# Patient Record
Sex: FEMALE | Race: WHITE | NOT HISPANIC OR LATINO | Employment: OTHER | URBAN - METROPOLITAN AREA
[De-identification: names, ages, dates, MRNs, and addresses within clinical notes are randomized per-mention and may not be internally consistent; named-entity substitution may affect disease eponyms.]

---

## 2023-01-14 ENCOUNTER — PATIENT MESSAGE (OUTPATIENT)
Dept: PRIMARY CARE CLINIC | Facility: CLINIC | Age: 68
End: 2023-01-14
Payer: COMMERCIAL

## 2023-02-08 ENCOUNTER — TELEPHONE (OUTPATIENT)
Dept: PRIMARY CARE CLINIC | Facility: CLINIC | Age: 68
End: 2023-02-08
Payer: COMMERCIAL

## 2023-02-08 DIAGNOSIS — E78.5 HYPERLIPIDEMIA, UNSPECIFIED HYPERLIPIDEMIA TYPE: ICD-10-CM

## 2023-02-08 DIAGNOSIS — E28.0 HYPERESTROGENISM: ICD-10-CM

## 2023-02-08 DIAGNOSIS — E03.9 HYPOTHYROIDISM, UNSPECIFIED TYPE: ICD-10-CM

## 2023-02-08 DIAGNOSIS — R73.03 PRE-DIABETES: ICD-10-CM

## 2023-02-08 DIAGNOSIS — E05.00 GRAVES DISEASE: Primary | ICD-10-CM

## 2023-02-08 NOTE — TELEPHONE ENCOUNTER
----- Message from Marielena Trinh sent at 2/7/2023  1:19 PM CST -----  Contact: vinicius  Patient is calling to speak with the nurse regarding appointment. Reports needing to know if she still has her appointment on 0313 at 1 pm from the other location. Please give the patient a call back at .[phone]  Thanks trae

## 2023-04-26 ENCOUNTER — TELEPHONE (OUTPATIENT)
Dept: PRIMARY CARE CLINIC | Facility: CLINIC | Age: 68
End: 2023-04-26
Payer: COMMERCIAL

## 2023-04-26 DIAGNOSIS — R73.03 PRE-DIABETES: ICD-10-CM

## 2023-04-26 DIAGNOSIS — E05.00 GRAVES DISEASE: ICD-10-CM

## 2023-04-26 DIAGNOSIS — E28.0 HYPERESTROGENISM: ICD-10-CM

## 2023-04-26 DIAGNOSIS — E55.9 VITAMIN D DEFICIENCY: ICD-10-CM

## 2023-04-26 DIAGNOSIS — E78.5 HYPERLIPIDEMIA, UNSPECIFIED HYPERLIPIDEMIA TYPE: Primary | ICD-10-CM

## 2023-04-27 ENCOUNTER — PATIENT MESSAGE (OUTPATIENT)
Dept: PRIMARY CARE CLINIC | Facility: CLINIC | Age: 68
End: 2023-04-27
Payer: COMMERCIAL

## 2023-05-01 RX ORDER — PHENTERMINE HYDROCHLORIDE 37.5 MG/1
TABLET ORAL
Qty: 30 TABLET | Refills: 0 | Status: SHIPPED | OUTPATIENT
Start: 2023-05-01 | End: 2023-05-18 | Stop reason: SDUPTHER

## 2023-05-01 NOTE — TELEPHONE ENCOUNTER
----- Message from Olya Harvey sent at 5/1/2023  2:12 PM CDT -----  Type:  RX Refill Request    Who Called: pt  Refill or New Rx:refill  RX Name and Strength:phentermine  How is the patient currently taking it? (ex. 1XDay):1XDay  Is this a 30 day or 90 day Rx: 30  Preferred Pharmacy with phone number:.  [facility] [place] - [place] - [address] [facility] [address] [address] [address]  [place] [address]  Phone: [phone] Fax: [phone]  Local or Mail Order:local  Ordering Provider:Dr Garcia  Would the patient rather a call back or a response via [facility]? call  Best Call Back Number:[phone]   Additional Information: She would like to get her pharmacy changed to [facility].    Thank you

## 2023-05-09 ENCOUNTER — TELEPHONE (OUTPATIENT)
Dept: PRIMARY CARE CLINIC | Facility: CLINIC | Age: 68
End: 2023-05-09
Payer: COMMERCIAL

## 2023-05-09 DIAGNOSIS — Z01.818 PRE-OP EXAM: Primary | ICD-10-CM

## 2023-05-09 NOTE — TELEPHONE ENCOUNTER
----- Message from Deana Ovalle sent at 5/8/2023  2:10 PM CDT -----  Contact: Amy  Patient is calling regarding an appt. Reports wanting to do an EKG today if possible . Please give patient a call back at.[phone]

## 2023-05-09 NOTE — TELEPHONE ENCOUNTER
----- Message from Marielena Trinh sent at 5/9/2023 11:12 AM CDT -----  Contact: vinicius  .Type:  Patient Returning Call    Who Called:vinicius   Who Left Message for Patient:nurse  Does the patient know what this is regarding?:unknown  Would the patient rather a call back or a response via [facility]? Call back  Best Call Back Number:.[phone]  Additional Information: patient returning call

## 2023-05-10 ENCOUNTER — CLINICAL SUPPORT (OUTPATIENT)
Dept: PRIMARY CARE CLINIC | Facility: CLINIC | Age: 68
End: 2023-05-10
Payer: COMMERCIAL

## 2023-05-10 DIAGNOSIS — Z01.818 PRE-OP EXAM: Primary | ICD-10-CM

## 2023-05-10 PROCEDURE — 93010 ELECTROCARDIOGRAM REPORT: CPT | Mod: S$GLB,,, | Performed by: INTERNAL MEDICINE

## 2023-05-10 PROCEDURE — 93005 ELECTROCARDIOGRAM TRACING: CPT | Mod: PBBFAC,PN | Performed by: INTERNAL MEDICINE

## 2023-05-10 PROCEDURE — 93005 ELECTROCARDIOGRAM TRACING: CPT | Mod: S$GLB,,, | Performed by: FAMILY MEDICINE

## 2023-05-10 PROCEDURE — 93005 EKG 12-LEAD: ICD-10-PCS | Mod: S$GLB,,, | Performed by: FAMILY MEDICINE

## 2023-05-10 PROCEDURE — 93010 EKG 12-LEAD: ICD-10-PCS | Mod: S$GLB,,, | Performed by: INTERNAL MEDICINE

## 2023-05-15 LAB
25(OH)D3+25(OH)D2 SERPL-MCNC: 57.9 NG/ML (ref 30–100)
ALBUMIN SERPL-MCNC: 4.4 G/DL (ref 3.8–4.8)
ALBUMIN/GLOB SERPL: 1.8 {RATIO} (ref 1.2–2.2)
ALP SERPL-CCNC: 86 IU/L (ref 44–121)
ALT SERPL-CCNC: 17 IU/L (ref 0–32)
APO B SERPL-MCNC: 115 MG/DL
AST SERPL-CCNC: 20 IU/L (ref 0–40)
BILIRUB SERPL-MCNC: <0.2 MG/DL (ref 0–1.2)
BUN SERPL-MCNC: 28 MG/DL (ref 8–27)
BUN/CREAT SERPL: 20 (ref 12–28)
C PEPTIDE SERPL-MCNC: 3.9 NG/ML (ref 1.1–4.4)
CALCIUM SERPL-MCNC: 9.6 MG/DL (ref 8.7–10.3)
CHLORIDE SERPL-SCNC: 99 MMOL/L (ref 96–106)
CO2 SERPL-SCNC: 20 MMOL/L (ref 20–29)
CREAT SERPL-MCNC: 1.37 MG/DL (ref 0.57–1)
ERYTHROCYTE [DISTWIDTH] IN BLOOD BY AUTOMATED COUNT: 13 % (ref 11.7–15.4)
EST. GFR  (NO RACE VARIABLE): 43 ML/MIN/1.73
GLOBULIN SER CALC-MCNC: 2.5 G/DL (ref 1.5–4.5)
GLUCOSE SERPL-MCNC: 88 MG/DL (ref 70–99)
HBA1C MFR BLD: 5.6 % (ref 4.8–5.6)
HCT VFR BLD AUTO: 39.7 % (ref 34–46.6)
HGB BLD-MCNC: 13.2 G/DL (ref 11.1–15.9)
INSULIN SERPL-ACNC: 8.8 UIU/ML (ref 2.6–24.9)
MCH RBC QN AUTO: 29.4 PG (ref 26.6–33)
MCHC RBC AUTO-ENTMCNC: 33.2 G/DL (ref 31.5–35.7)
MCV RBC AUTO: 88 FL (ref 79–97)
PLATELET # BLD AUTO: 359 X10E3/UL (ref 150–450)
POTASSIUM SERPL-SCNC: 4.5 MMOL/L (ref 3.5–5.2)
PROT SERPL-MCNC: 6.9 G/DL (ref 6–8.5)
RBC # BLD AUTO: 4.49 X10E6/UL (ref 3.77–5.28)
SODIUM SERPL-SCNC: 142 MMOL/L (ref 134–144)
T3 SERPL-MCNC: 158 NG/DL (ref 71–180)
T4 FREE SERPL-MCNC: 0.73 NG/DL (ref 0.82–1.77)
TESTOST FREE SERPL-MCNC: 0.3 PG/ML (ref 0–4.2)
TESTOST SERPL-MCNC: 40.9 NG/DL (ref 7–40)
THYROGLOB AB SERPL-ACNC: 2.1 IU/ML (ref 0–0.9)
THYROPEROXIDASE AB SERPL-ACNC: 35 IU/ML (ref 0–34)
TSH SERPL DL<=0.005 MIU/L-ACNC: 2.1 UIU/ML (ref 0.45–4.5)
WBC # BLD AUTO: 8.3 X10E3/UL (ref 3.4–10.8)

## 2023-05-18 ENCOUNTER — OFFICE VISIT (OUTPATIENT)
Dept: PRIMARY CARE CLINIC | Facility: CLINIC | Age: 68
End: 2023-05-18
Payer: MEDICARE

## 2023-05-18 DIAGNOSIS — E55.9 VITAMIN D DEFICIENCY: ICD-10-CM

## 2023-05-18 DIAGNOSIS — Z86.39 HISTORY OF GRAVES' DISEASE: ICD-10-CM

## 2023-05-18 DIAGNOSIS — R53.83 FATIGUE, UNSPECIFIED TYPE: ICD-10-CM

## 2023-05-18 DIAGNOSIS — R60.0 PERIPHERAL EDEMA: ICD-10-CM

## 2023-05-18 DIAGNOSIS — F17.200 SMOKER: ICD-10-CM

## 2023-05-18 DIAGNOSIS — R73.03 PREDIABETES: ICD-10-CM

## 2023-05-18 DIAGNOSIS — E78.5 HYPERLIPIDEMIA, UNSPECIFIED HYPERLIPIDEMIA TYPE: ICD-10-CM

## 2023-05-18 DIAGNOSIS — N18.2 STAGE 2 CHRONIC KIDNEY DISEASE: ICD-10-CM

## 2023-05-18 DIAGNOSIS — E89.0 POSTABLATIVE HYPOTHYROIDISM: Primary | ICD-10-CM

## 2023-05-18 DIAGNOSIS — E27.40 HYPOADRENALISM: ICD-10-CM

## 2023-05-18 DIAGNOSIS — E28.0 HYPERESTROGENISM: ICD-10-CM

## 2023-05-18 DIAGNOSIS — E06.3 HASHIMOTO'S DISEASE: ICD-10-CM

## 2023-05-18 PROBLEM — F41.9 ANXIETY: Status: ACTIVE | Noted: 2023-05-18

## 2023-05-18 PROCEDURE — 99215 OFFICE O/P EST HI 40 MIN: CPT | Mod: 95,,, | Performed by: FAMILY MEDICINE

## 2023-05-18 PROCEDURE — 99215 PR OFFICE/OUTPT VISIT, EST, LEVL V, 40-54 MIN: ICD-10-PCS | Mod: 95,,, | Performed by: FAMILY MEDICINE

## 2023-05-18 RX ORDER — METFORMIN HYDROCHLORIDE 500 MG/1
1000 TABLET ORAL 2 TIMES DAILY
Qty: 360 TABLET | Refills: 1 | Status: SHIPPED | OUTPATIENT
Start: 2023-05-18 | End: 2024-01-09

## 2023-05-18 RX ORDER — ALPRAZOLAM 0.5 MG/1
0.5 TABLET, EXTENDED RELEASE ORAL 2 TIMES DAILY PRN
Qty: 45 TABLET | Refills: 2 | Status: SHIPPED | OUTPATIENT
Start: 2023-05-18 | End: 2023-09-15

## 2023-05-18 RX ORDER — TRIAMTERENE/HYDROCHLOROTHIAZID 37.5-25 MG
1 TABLET ORAL DAILY PRN
Qty: 90 TABLET | Refills: 1 | Status: SHIPPED | OUTPATIENT
Start: 2023-05-18 | End: 2023-09-18

## 2023-05-18 RX ORDER — MELOXICAM 15 MG/1
15 TABLET ORAL
COMMUNITY
Start: 2023-04-29 | End: 2023-09-18 | Stop reason: SDUPTHER

## 2023-05-18 RX ORDER — CYANOCOBALAMIN 1000 UG/ML
1000 INJECTION, SOLUTION INTRAMUSCULAR; SUBCUTANEOUS
Qty: 1 ML | Refills: 5 | Status: SHIPPED | OUTPATIENT
Start: 2023-05-18 | End: 2023-05-31 | Stop reason: SDUPTHER

## 2023-05-18 RX ORDER — HYDROCODONE BITARTRATE AND ACETAMINOPHEN 10; 325 MG/1; MG/1
.5-1 TABLET ORAL 3 TIMES DAILY
COMMUNITY
Start: 2023-04-11

## 2023-05-18 RX ORDER — PHENTERMINE HYDROCHLORIDE 37.5 MG/1
37.5 TABLET ORAL EVERY MORNING
Qty: 30 TABLET | Refills: 2 | Status: SHIPPED | OUTPATIENT
Start: 2023-05-18 | End: 2023-09-07

## 2023-05-18 NOTE — PROGRESS NOTES
"    [facility]       [address] [place] [address]      [phone]     Mary Garcia MD         .      Office Visit  05/18/2023  [id number]      SUBJECTIVE     HPI:  Amy Charles is a 67 y.o. female presents today in clinic for "No chief complaint on file.  ."   Patient is here from [facility]- has long hx of graves postablative hypothyroid- now on replacment therapy. Also has hx of anxiety and chronic pain- she is long time smoker and doesn't want to quit. She has battled with obesity and is getting it better controlled with phentermine  she also has hx of adhd and used to take adderall but patient likes taking adipex as it helps both with adhd and weight.  Denies any side effects.        ROS   Review of symptoms are negative except as noted.     PAST MEDICAL HISTORY     Past Medical History:   Diagnosis Date    Anxiety     BRCA gene mutation negative     Chronic bronchitis     DDD (degenerative disc disease)     Fatigue     Fibromyalgia     Graves' disease with exophthalmos     Hashimoto's disease     Hepatitis B carrier     Hyperthyroidism     Prediabetes 5/18/2023    Scotoma involving central area     Tinnitus of both ears        Past Surgical History:   Procedure Laterality Date    AUGMENTATION OF BREAST Bilateral 1979    Replaced 1998- Removed 2014    CATARACT EXTRACTION Bilateral 03/2015    ENDOMETRIAL ABLATION      HYSTEROSCOPY      LOOP ELECTROSURGICAL EXCISION PROCEDURE (LEEP)      THYROIDECTOMY      TONSILLECTOMY Bilateral 1965    TUBAL LIGATION Bilateral 1990       Social History     Socioeconomic History    Marital status:    Tobacco Use    Smoking status: Every Day     Packs/day: 1.00     Years: 20.00     Pack years: 20.00     Types: Cigarettes     Start date: 1/1/1992    Smokeless tobacco: Never   Substance and Sexual Activity    Alcohol use: Yes     Comment: Drinks on special occasions, approximately 4-5 times a year.    Drug " use: No    Sexual activity: Not Currently     Partners: Male     Birth control/protection: Post-menopausal     Comment: TL       Allergies as of 05/18/2023 - Reviewed 05/18/2023   Allergen Reaction Noted    Cefuroxime axetil  08/28/2014    Codeine  08/28/2014    Penicillins  08/28/2014    Sulfa (sulfonamide antibiotics)  08/28/2014    Tuna oil  11/14/2014    Wasp sting [allergen ext-venom-honey bee]  11/14/2014    Wasp venom Other (See Comments) 11/07/2021       HOME MEDS     Current Outpatient Medications   Medication Instructions    ALPRAZolam (XANAX XR) 0.5 mg, Oral, 2 times daily PRN    cyanocobalamin 1,000 mcg, Intramuscular, Every 30 days    HYDROcodone-acetaminophen (NORCO)  mg per tablet 0.5-1 tablets, Oral, 3 times daily    HYDROcodone-acetaminophen (NORCO) 5-325 mg per tablet TAKE 1 TABLET BY MOUTH EVERY 6 HOURS MAY CAUSE DROWSINESS    lansoprazole (PREVACID) 30 MG capsule Oral, Daily PRN    liothyronine (CYTOMEL) 25 MCG Tab 0.5 tablets, Oral, 2 times daily    meloxicam (MOBIC ORAL) No dose, route, or frequency recorded.    meloxicam (MOBIC) 15 mg, Oral    metFORMIN (GLUCOPHAGE) 1,000 mg, Oral, 2 times daily    mupirocin (BACTROBAN) 2 % ointment APPLY TOPICALLY 3 TIMES A DAY    phentermine (ADIPEX-P) 37.5 mg, Oral, Every morning    RESTASIS 0.05 % ophthalmic emulsion PLACE 1 drop IN EACH EYE TWICE DAILY    SYNTHROID 50 mcg, Oral, Every morning    triamterene-hydrochlorothiazide 37.5-25 mg (MAXZIDE-25) 37.5-25 mg per tablet 1 tablet, Oral, Daily PRN       The following were updated and reviewed by myself in the chart: medications, past medical history, past surgical history, family history, social history, and allergies.        Objective    OBJECTIVE   There were no vitals taken for this visit.    Wt Readings from Last 2 Encounters:   11/17/22 58.1 kg (128 lb)   11/09/21 66.7 kg (147 lb)       BP Readings from Last 2 Encounters:   11/17/22 123/79   11/09/21 124/67          Physical  Exam  Constitutional:       Appearance: Normal appearance. She is overweight.   HENT:      Head: Normocephalic and atraumatic.      Nose: Nose normal.   Eyes:      General: Allergic shiner present.      Comments: Exophtalmos bilateral   Pulmonary:      Effort: Pulmonary effort is normal.   Abdominal:      Comments: Truncal obesity   Skin:     Comments: pallor   Neurological:      General: No focal deficit present.      Mental Status: She is alert. Mental status is at baseline.      Cranial Nerves: Cranial nerves 2-12 are intact.      Sensory: Sensation is intact.      Motor: Motor function is intact.   Psychiatric:         Mood and Affect: Mood is anxious.         Behavior: Behavior normal.             LABS      LAB RESULTS, IF AVAILABLE, ARE DISCUSSED WITH PATIENT AND POSTED TO PATIENT PORTAL     ASSESSMENT & PLAN     1. Postablative hypothyroidism  *Alternate remedies that can help with inflammation associated with Hashimoto's include such things such as Selenium 200mg supplementation, adhering to a gluten free diet as well as adding LDN as a non - FDA approved option for therapy. Patient should comply with taking medications as directed. Patient should take thyroid meds on empty stomach and avoid taking with iron, calcium, zinc and fiber.  Potential risks are associated with suppressing TSH (increased arrhythmia and bone loss) as emphasis is on improving patient symptoms. Patient should notify us if any symptoms occur suggesting  overactive thyroid (fast heart rate, anxiety, sleeplessness, and tremors).     2. Hyperlipidemia, unspecified hyperlipidemia type  Reviewed importance of advanced lipid profiles. Advise daily exercise. Diet is recommended. Patients are encouraged to obtain healthy BMI weight. Risks associated with high cholesterol are well established and include but are not limited to heart disease, stroke and peripheral vascular disease. Patient should not smoke. Goal LDL particle number is <1000 and  ApoB <70. Basic LDL is below 100 or below 70 if diabetic. Some non-FDA approved dietary supplements that may be beneficial to patient include but is not limited to high fiber diet at least 30g daily; niacin ER non flush free variety 500mg-1,000mg; Omega-3 fish oil DHA+EPA = 1,000mg twice daily; baby dose aspirin 81mg; co-enzyme q10 500mg to help reduce statin-induced myalgia; red rice yeast 1200mg twice daily; berberine 1000mg-2000mg daily. Patient is encouraged to exercise routinely and adhere to heart healthy diet with Mediterranean diet showing the most consistent data to help with lipid management.      3. History of Graves' disease      4. Prediabetes  Pathophysiology of insulin resistance discussed with patient and therapeutic options were explained.  Dietary and lifestyle changes are recommended for the treatment of insulin resistance.   Low carbohydrate diet and/or possible intermittent fasting is recommended for insulin resistance and/or prediabetes.  Non FDA approved treatment options include but is not limited to GLP's, SGLT-2's, biguanides, and other glucose support supplements. Patient will notify us with any concerns or complaints regarding treatment plan.Weight loss is strongly encouraged and is emphasized to help reduce risk of diabetes.  On metformin- but will reduce to one half and see if this helps with renal function    5. Hyperestrogenism      6. Smoker  Doesn't want to stop smoking at this time    7. Vitamin D deficiency  On replacement therapy    8. Hypoadrenalism  Restarted dhea and testosteorne levels look ok    9. Stage 2 chronic kidney disease  Dehydration vs med effect- advisd patient to take diuretic as needed, reduce metformin and try stopping mobic    10. Peripheral edema    11. Hashimoto's disease  Alternate remedies that can help with inflammation associated with Hashimoto's include such things such as Selenium 200mg supplementation, adhering to a gluten free diet as well as adding  "LDN as a non - FDA approved option for therapy. Patient should comply with taking medications as directed. Patient should take thyroid meds on empty stomach and avoid taking with iron, calcium, zinc and fiber.  Potential risks are associated with suppressing TSH (increased arrhythmia and bone loss) as emphasis is on improving patient symptoms. Patient should notify us if any symptoms occur suggesting  overactive thyroid (fast heart rate, anxiety, sleeplessness, and tremors). Continue- tsh is ok    12. Fatigue, unspecified type  Better with b12 shots  -     cyanocobalamin 1,000 mcg/mL injection; Inject 1 mL (1,000 mcg total) into the muscle every 30 days.  Dispense: 1 mL; Refill: 5    Other orders  -     triamterene-hydrochlorothiazide 37.5-25 mg (MAXZIDE-25) 37.5-25 mg per tablet; Take 1 tablet by mouth daily as needed (edema).  Dispense: 90 tablet; Refill: 1  -     phentermine (ADIPEX-P) 37.5 mg tablet; Take 1 tablet (37.5 mg total) by mouth every morning.  Dispense: 30 tablet; Refill: 2  -     metFORMIN (GLUCOPHAGE) 500 MG tablet; Take 2 tablets (1,000 mg total) by mouth 2 (two) times daily.  Dispense: 360 tablet; Refill: 1  -     ALPRAZolam (XANAX XR) 0.5 MG Tb24; Take 1 tablet (0.5 mg total) by mouth 2 (two) times daily as needed (anxiety).  Dispense: 45 tablet; Refill: 2           I spent a total of 45 minutes face to face and non-face to face on the date of this visit.This includes time preparing to see the patient (eg, review of tests, notes), obtaining and/or reviewing additional history from an independent historian and/or outside medical records, documenting clinical information in the electronic health record, independently interpreting results and/or communicating results to the patient/family/caregiver, or care coordinator.      Disclaimer: Portions of this record may have been created with voice recognition software. Occasional wrong-word or "sound-a-like" substitutions may have occurred due to " "inherent limitations of voice recognition software. Read the chart carefully and recognize, using context, where substitutions have occurred."    Signed by:  Mary Garcia MD    @[email or web address]@       "

## 2023-05-19 ENCOUNTER — PATIENT MESSAGE (OUTPATIENT)
Dept: ADMINISTRATIVE | Facility: HOSPITAL | Age: 68
End: 2023-05-19
Payer: COMMERCIAL

## 2023-05-30 ENCOUNTER — PATIENT MESSAGE (OUTPATIENT)
Dept: PRIMARY CARE CLINIC | Facility: CLINIC | Age: 68
End: 2023-05-30
Payer: COMMERCIAL

## 2023-05-30 DIAGNOSIS — Z86.39 HISTORY OF GRAVES' DISEASE: ICD-10-CM

## 2023-05-30 DIAGNOSIS — R53.83 FATIGUE, UNSPECIFIED TYPE: ICD-10-CM

## 2023-05-30 DIAGNOSIS — R73.03 PREDIABETES: ICD-10-CM

## 2023-05-30 DIAGNOSIS — E89.0 POSTABLATIVE HYPOTHYROIDISM: Primary | ICD-10-CM

## 2023-06-01 RX ORDER — CYANOCOBALAMIN 1000 UG/ML
1000 INJECTION, SOLUTION INTRAMUSCULAR; SUBCUTANEOUS WEEKLY
Qty: 4 ML | Refills: 5 | Status: SHIPPED | OUTPATIENT
Start: 2023-06-01 | End: 2023-12-07

## 2023-08-03 ENCOUNTER — PATIENT MESSAGE (OUTPATIENT)
Dept: PRIMARY CARE CLINIC | Facility: CLINIC | Age: 68
End: 2023-08-03
Payer: COMMERCIAL

## 2023-08-03 RX ORDER — KETOCONAZOLE 20 MG/G
CREAM TOPICAL
COMMUNITY
End: 2024-01-18

## 2023-08-03 RX ORDER — KETOCONAZOLE 20 MG/G
CREAM TOPICAL DAILY
Qty: 30 G | Refills: 0 | Status: SHIPPED | OUTPATIENT
Start: 2023-08-03 | End: 2023-10-06

## 2023-08-03 RX ORDER — TRIAMCINOLONE ACETONIDE 1 MG/G
CREAM TOPICAL 2 TIMES DAILY
Qty: 80 G | Refills: 0 | Status: SHIPPED | OUTPATIENT
Start: 2023-08-03

## 2023-08-16 ENCOUNTER — PATIENT MESSAGE (OUTPATIENT)
Dept: PRIMARY CARE CLINIC | Facility: CLINIC | Age: 68
End: 2023-08-16
Payer: COMMERCIAL

## 2023-09-02 LAB
ALBUMIN SERPL-MCNC: 3.7 G/DL (ref 3.9–4.9)
ALBUMIN/GLOB SERPL: 1.6 {RATIO} (ref 1.2–2.2)
ALP SERPL-CCNC: 79 IU/L (ref 44–121)
ALT SERPL-CCNC: 13 IU/L (ref 0–32)
AST SERPL-CCNC: 14 IU/L (ref 0–40)
BILIRUB SERPL-MCNC: <0.2 MG/DL (ref 0–1.2)
BUN SERPL-MCNC: 24 MG/DL (ref 8–27)
BUN/CREAT SERPL: 21 (ref 12–28)
CALCIUM SERPL-MCNC: 9.2 MG/DL (ref 8.7–10.3)
CHLORIDE SERPL-SCNC: 103 MMOL/L (ref 96–106)
CHOLEST SERPL-MCNC: 216 MG/DL (ref 100–199)
CO2 SERPL-SCNC: 25 MMOL/L (ref 20–29)
CREAT SERPL-MCNC: 1.13 MG/DL (ref 0.57–1)
ERYTHROCYTE [DISTWIDTH] IN BLOOD BY AUTOMATED COUNT: 12.8 % (ref 11.7–15.4)
EST. GFR  (NO RACE VARIABLE): 54 ML/MIN/1.73
GLOBULIN SER CALC-MCNC: 2.3 G/DL (ref 1.5–4.5)
GLUCOSE SERPL-MCNC: 83 MG/DL (ref 70–99)
HCT VFR BLD AUTO: 38.9 % (ref 34–46.6)
HDLC SERPL-MCNC: 72 MG/DL
HGB BLD-MCNC: 12.5 G/DL (ref 11.1–15.9)
LDLC SERPL CALC-MCNC: 117 MG/DL (ref 0–99)
MCH RBC QN AUTO: 28.5 PG (ref 26.6–33)
MCHC RBC AUTO-ENTMCNC: 32.1 G/DL (ref 31.5–35.7)
MCV RBC AUTO: 89 FL (ref 79–97)
PLATELET # BLD AUTO: 343 X10E3/UL (ref 150–450)
POTASSIUM SERPL-SCNC: 4.5 MMOL/L (ref 3.5–5.2)
PROT SERPL-MCNC: 6 G/DL (ref 6–8.5)
RBC # BLD AUTO: 4.39 X10E6/UL (ref 3.77–5.28)
SODIUM SERPL-SCNC: 142 MMOL/L (ref 134–144)
T3 SERPL-MCNC: 152 NG/DL (ref 71–180)
T3FREE SERPL-MCNC: 3.8 PG/ML (ref 2–4.4)
T4 FREE SERPL-MCNC: 0.69 NG/DL (ref 0.82–1.77)
THYROPEROXIDASE AB SERPL-ACNC: 40 IU/ML (ref 0–34)
TRIGL SERPL-MCNC: 157 MG/DL (ref 0–149)
TSH SERPL DL<=0.005 MIU/L-ACNC: 3.23 UIU/ML (ref 0.45–4.5)
VLDLC SERPL CALC-MCNC: 27 MG/DL (ref 5–40)
WBC # BLD AUTO: 9.4 X10E3/UL (ref 3.4–10.8)

## 2023-09-06 ENCOUNTER — PATIENT MESSAGE (OUTPATIENT)
Dept: PRIMARY CARE CLINIC | Facility: CLINIC | Age: 68
End: 2023-09-06
Payer: COMMERCIAL

## 2023-09-07 RX ORDER — PHENTERMINE HYDROCHLORIDE 37.5 MG/1
TABLET ORAL
Qty: 30 TABLET | Refills: 2 | Status: SHIPPED | OUTPATIENT
Start: 2023-09-07 | End: 2023-12-07

## 2023-09-15 RX ORDER — ALPRAZOLAM 0.5 MG/1
TABLET, EXTENDED RELEASE ORAL
Qty: 45 TABLET | Refills: 2 | Status: SHIPPED | OUTPATIENT
Start: 2023-09-15 | End: 2023-12-18 | Stop reason: SDUPTHER

## 2023-09-18 ENCOUNTER — OFFICE VISIT (OUTPATIENT)
Dept: PRIMARY CARE CLINIC | Facility: CLINIC | Age: 68
End: 2023-09-18
Payer: MEDICARE

## 2023-09-18 ENCOUNTER — PATIENT MESSAGE (OUTPATIENT)
Dept: PRIMARY CARE CLINIC | Facility: CLINIC | Age: 68
End: 2023-09-18

## 2023-09-18 VITALS — HEIGHT: 59 IN | WEIGHT: 115 LBS | BODY MASS INDEX: 23.18 KG/M2

## 2023-09-18 DIAGNOSIS — M19.90 OSTEOARTHRITIS, UNSPECIFIED OSTEOARTHRITIS TYPE, UNSPECIFIED SITE: ICD-10-CM

## 2023-09-18 DIAGNOSIS — F17.200 SMOKER: ICD-10-CM

## 2023-09-18 DIAGNOSIS — E05.00 GRAVES' ORBITOPATHY: ICD-10-CM

## 2023-09-18 DIAGNOSIS — E89.0 POSTABLATIVE HYPOTHYROIDISM: ICD-10-CM

## 2023-09-18 DIAGNOSIS — N18.31 CHRONIC KIDNEY DISEASE, STAGE 3A: Primary | ICD-10-CM

## 2023-09-18 DIAGNOSIS — E78.5 HYPERLIPIDEMIA, UNSPECIFIED HYPERLIPIDEMIA TYPE: ICD-10-CM

## 2023-09-18 DIAGNOSIS — F41.9 ANXIETY: ICD-10-CM

## 2023-09-18 DIAGNOSIS — E06.3 HASHIMOTO'S DISEASE: ICD-10-CM

## 2023-09-18 DIAGNOSIS — R53.83 FATIGUE, UNSPECIFIED TYPE: ICD-10-CM

## 2023-09-18 DIAGNOSIS — F17.200 CURRENT SMOKER: ICD-10-CM

## 2023-09-18 DIAGNOSIS — R73.03 PREDIABETES: ICD-10-CM

## 2023-09-18 PROCEDURE — 99215 OFFICE O/P EST HI 40 MIN: CPT | Mod: 95,,, | Performed by: FAMILY MEDICINE

## 2023-09-18 PROCEDURE — 99215 PR OFFICE/OUTPT VISIT, EST, LEVL V, 40-54 MIN: ICD-10-PCS | Mod: 95,,, | Performed by: FAMILY MEDICINE

## 2023-09-18 RX ORDER — MELOXICAM 15 MG/1
15 TABLET ORAL DAILY
Qty: 30 TABLET | Refills: 5 | Status: SHIPPED | OUTPATIENT
Start: 2023-09-18

## 2023-09-18 RX ORDER — LIOTHYRONINE SODIUM 25 UG/1
TABLET ORAL
Qty: 30 TABLET | Refills: 5 | Status: SHIPPED | OUTPATIENT
Start: 2023-09-18

## 2023-09-18 RX ORDER — LEVOTHYROXINE SODIUM 75 UG/1
75 TABLET ORAL
Qty: 30 TABLET | Refills: 5 | Status: SHIPPED | OUTPATIENT
Start: 2023-09-18

## 2023-09-18 NOTE — ASSESSMENT & PLAN NOTE
Age versus medication related.  Did improve after stopping daily diuretic and taking metformin at a lesser dose and Mobic as needed.  We will continue to monitor.

## 2023-09-18 NOTE — ASSESSMENT & PLAN NOTE
Elevated, but patient has tried statins before and did not tolerate.  She is smoker with some high risk factors.

## 2023-09-18 NOTE — ASSESSMENT & PLAN NOTE
Patient does feel better being on the higher dose of thyroid medication so given TSH and symptoms, we will increase the Synthroid

## 2023-09-18 NOTE — PROGRESS NOTES
[facility]       [address] [place] [address]      [phone]     Mary Garcia MD         .      TELEMEDICINE VISIT  09/18/2023    Amy Charles is a 67 y.o. female who presents for Telemed visit.   The patient location is: [place]    The chief complaint leading to consultation is: is doing well. Wants to discuss recent labs. She is more fatigued than usual. Patient is  still taking diet meds.  Weight is stable and she has been able to maintain weight loss.  Adipex helps with both energy ang add .       REVIEW OF SYMPTOMS  All negative except as stated above.      PHYSICAL EXAM:  Alert and awake, Normal appearance, Fatigued appearing, and overweight/obese  Normocephalic, Atraumatic , and Normal facies  EOMI intact and Clear conjunctivae    No cyanosis and No labored breathing  Normal Abdomen and No tenderness/guarding  Normal Musculoskeletal Exam and Normal Range of Motion    Normal mood, Normal affect, Normal behavior, Congruent sppech, Normal memory, Normal speech, and concerned      ASSESSMENT AND PLAN      1. Chronic kidney disease, stage 3a  Comments:  improved since stopping  Assessment & Plan:  Age versus medication related.  Did improve after stopping daily diuretic and taking metformin at a lesser dose and Mobic as needed.  We will continue to monitor.      2. Postablative hypothyroidism  -     SYNTHROID 75 mcg tablet; Take 1 tablet (75 mcg total) by mouth before breakfast.  Dispense: 30 tablet; Refill: 5  -     liothyronine (CYTOMEL) 25 MCG Tab; Take 1/2 tablet twice daily as directed  Dispense: 30 tablet; Refill: 5    3. Fatigue, unspecified type    4. Hyperlipidemia, unspecified hyperlipidemia type  Overview:  Reviewed importance of advanced lipid profiles. Advise daily exercise. Diet is recommended. Patients are encouraged to obtain healthy BMI weight. Risks associated with high cholesterol are well established and  include but are not limited to heart disease, stroke and peripheral vascular disease. Patient should not smoke. Goal LDL particle number is <1000 and ApoB <70. Basic LDL is below 100 or below 70 if diabetic. Some non-FDA approved dietary supplements that may be beneficial to patient include but is not limited to high fiber diet at least 30g daily; niacin ER non flush free variety 500mg-1,000mg; Omega-3 fish oil DHA+EPA = 1,000mg twice daily; baby dose aspirin 81mg; co-enzyme q10 500mg to help reduce statin-induced myalgia; red rice yeast 1200mg twice daily; berberine 1000mg-2000mg daily. Patient is encouraged to exercise routinely and adhere to heart healthy diet with Mediterranean diet showing the most consistent data to help with lipid management.     Assessment & Plan:  Elevated, but patient has tried statins before and did not tolerate.  She is smoker with some high risk factors.      5. Current smoker  Assessment & Plan:  Patient is not willing to stop smoking at this point.      6. Smoker    7. Osteoarthritis, unspecified osteoarthritis type, unspecified site  -     meloxicam (MOBIC) 15 MG tablet; Take 1 tablet (15 mg total) by mouth once daily.  Dispense: 30 tablet; Refill: 5    8. Anxiety  Overview:  It is advised to identify triggers for anxiety and consider the impact of anxious thinking on functioning. Discussed strategies to regulate symptoms and need for compliance with treatment.  Therapy or counseling may be necessary if current regimen is ineffective. Patient will report any worsening or change in anxiety symptoms if necessary.      Assessment & Plan:  Improve with the extended release Xanax.  We will continue.      9. Graves' orbitopathy  Assessment & Plan:  Stable and asymptomatic      10. Hashimoto's disease  Overview:  Alternate remedies that can help with inflammation associated with Hashimoto's include such things such as Selenium 200mg supplementation, adhering to a gluten free diet as well as  adding LDN as a non - FDA approved option for therapy. Patient should comply with taking medications as directed. Patient should take thyroid meds on empty stomach and avoid taking with iron, calcium, zinc and fiber.  Potential risks are associated with suppressing TSH (increased arrhythmia and bone loss) as emphasis is on improving patient symptoms. Patient should notify us if any symptoms occur suggesting  overactive thyroid (fast heart rate, anxiety, sleeplessness, and tremors).     Assessment & Plan:  Patient does feel better being on the higher dose of thyroid medication so given TSH and symptoms, we will increase the Synthroid      11. Prediabetes  Overview:  Pathophysiology of insulin resistance discussed with patient and therapeutic options were explained.  Dietary and lifestyle changes are recommended for the treatment of insulin resistance.   Low carbohydrate diet and/or possible intermittent fasting is recommended for insulin resistance and/or prediabetes.  Non FDA approved treatment options include but is not limited to GLP's, SGLT-2's, biguanides, and other glucose support supplements. Patient will notify us with any concerns or complaints regarding treatment plan.Weight loss is strongly encouraged and is emphasized to help reduce risk of diabetes.      Assessment & Plan:  On metformin, okay to take at a reduced dose.            I spent a total of 40 minutes face to face and non-face to face on the date of this visit.This includes time preparing to see the patient (eg, review of tests, notes), obtaining and/or reviewing additional history from an independent historian and/or outside medical records, documenting clinical information in the electronic health record, independently interpreting results and/or communicating results to the patient/family/caregiver, or care coordinator.    Disclaimer: Portions of this record may have been created with voice recognition software. Occasional wrong-word or  ""sound-a-like" substitutions may have occurred due to inherent limitations of voice recognition software. Read the chart carefully and recognize, using context, where substitutions have occurred."    Visit type: Telemedicine:audio and visual  Each patient to whom he or she provides medical services by telemedicine is:  (1) informed of the relationship between the physician and patient and the respective role of any other health care provider with respect to management of the patient; and (2) notified that he or she may decline to receive medical services by telemedicine and may withdraw from such care at any time.    Signed by:  Mary Garcia MD         "

## 2023-10-06 RX ORDER — KETOCONAZOLE 20 MG/G
CREAM TOPICAL DAILY
Qty: 30 G | Refills: 0 | Status: SHIPPED | OUTPATIENT
Start: 2023-10-06

## 2023-12-06 ENCOUNTER — PATIENT MESSAGE (OUTPATIENT)
Dept: PRIMARY CARE CLINIC | Facility: CLINIC | Age: 68
End: 2023-12-06
Payer: COMMERCIAL

## 2023-12-06 DIAGNOSIS — R53.83 FATIGUE, UNSPECIFIED TYPE: ICD-10-CM

## 2023-12-07 RX ORDER — PHENTERMINE HYDROCHLORIDE 37.5 MG/1
TABLET ORAL
Qty: 30 TABLET | Refills: 2 | Status: SHIPPED | OUTPATIENT
Start: 2023-12-07 | End: 2024-03-07

## 2023-12-07 RX ORDER — CYANOCOBALAMIN 1000 UG/ML
INJECTION, SOLUTION INTRAMUSCULAR; SUBCUTANEOUS
Qty: 4 ML | Refills: 5 | Status: SHIPPED | OUTPATIENT
Start: 2023-12-07

## 2023-12-19 RX ORDER — ALPRAZOLAM 0.5 MG/1
TABLET, EXTENDED RELEASE ORAL
Qty: 45 TABLET | Refills: 2 | Status: SHIPPED | OUTPATIENT
Start: 2023-12-19 | End: 2024-03-18

## 2024-01-09 RX ORDER — METFORMIN HYDROCHLORIDE 500 MG/1
1000 TABLET ORAL 2 TIMES DAILY
Qty: 360 TABLET | Refills: 1 | Status: SHIPPED | OUTPATIENT
Start: 2024-01-09

## 2024-01-11 LAB
ALBUMIN SERPL-MCNC: 4.3 G/DL (ref 3.9–4.9)
ALBUMIN/GLOB SERPL: 1.7 {RATIO} (ref 1.2–2.2)
ALP SERPL-CCNC: 83 IU/L (ref 44–121)
ALT SERPL-CCNC: 15 IU/L (ref 0–32)
AST SERPL-CCNC: 18 IU/L (ref 0–40)
BASOPHILS # BLD AUTO: 0.1 X10E3/UL (ref 0–0.2)
BASOPHILS NFR BLD AUTO: 1 %
BILIRUB SERPL-MCNC: <0.2 MG/DL (ref 0–1.2)
BUN SERPL-MCNC: 21 MG/DL (ref 8–27)
BUN/CREAT SERPL: 17 (ref 12–28)
CALCIUM SERPL-MCNC: 9.7 MG/DL (ref 8.7–10.3)
CHLORIDE SERPL-SCNC: 101 MMOL/L (ref 96–106)
CHOLEST SERPL-MCNC: 268 MG/DL (ref 100–199)
CO2 SERPL-SCNC: 20 MMOL/L (ref 20–29)
CREAT SERPL-MCNC: 1.24 MG/DL (ref 0.57–1)
EOSINOPHIL # BLD AUTO: 0.2 X10E3/UL (ref 0–0.4)
EOSINOPHIL NFR BLD AUTO: 2 %
ERYTHROCYTE [DISTWIDTH] IN BLOOD BY AUTOMATED COUNT: 12.4 % (ref 11.7–15.4)
EST. GFR  (NO RACE VARIABLE): 48 ML/MIN/1.73
GLOBULIN SER CALC-MCNC: 2.5 G/DL (ref 1.5–4.5)
GLUCOSE SERPL-MCNC: 155 MG/DL (ref 70–99)
HBA1C MFR BLD: 5.7 % (ref 4.8–5.6)
HCT VFR BLD AUTO: 40.3 % (ref 34–46.6)
HDLC SERPL-MCNC: 69 MG/DL
HGB BLD-MCNC: 13.4 G/DL (ref 11.1–15.9)
IMM GRANULOCYTES # BLD AUTO: 0 X10E3/UL (ref 0–0.1)
IMM GRANULOCYTES NFR BLD AUTO: 0 %
LDLC SERPL CALC-MCNC: 170 MG/DL (ref 0–99)
LYMPHOCYTES # BLD AUTO: 3.5 X10E3/UL (ref 0.7–3.1)
LYMPHOCYTES NFR BLD AUTO: 35 %
MCH RBC QN AUTO: 28.6 PG (ref 26.6–33)
MCHC RBC AUTO-ENTMCNC: 33.3 G/DL (ref 31.5–35.7)
MCV RBC AUTO: 86 FL (ref 79–97)
MONOCYTES # BLD AUTO: 0.7 X10E3/UL (ref 0.1–0.9)
MONOCYTES NFR BLD AUTO: 7 %
NEUTROPHILS # BLD AUTO: 5.6 X10E3/UL (ref 1.4–7)
NEUTROPHILS NFR BLD AUTO: 55 %
PLATELET # BLD AUTO: 401 X10E3/UL (ref 150–450)
POTASSIUM SERPL-SCNC: 5.5 MMOL/L (ref 3.5–5.2)
PROT SERPL-MCNC: 6.8 G/DL (ref 6–8.5)
RBC # BLD AUTO: 4.69 X10E6/UL (ref 3.77–5.28)
SODIUM SERPL-SCNC: 141 MMOL/L (ref 134–144)
T3FREE SERPL-MCNC: 5.8 PG/ML (ref 2–4.4)
T4 FREE SERPL-MCNC: 0.75 NG/DL (ref 0.82–1.77)
THYROPEROXIDASE AB SERPL-ACNC: 32 IU/ML (ref 0–34)
TRIGL SERPL-MCNC: 163 MG/DL (ref 0–149)
TSH SERPL DL<=0.005 MIU/L-ACNC: 0.84 UIU/ML (ref 0.45–4.5)
VLDLC SERPL CALC-MCNC: 29 MG/DL (ref 5–40)
WBC # BLD AUTO: 10.1 X10E3/UL (ref 3.4–10.8)

## 2024-01-18 ENCOUNTER — OFFICE VISIT (OUTPATIENT)
Dept: PRIMARY CARE CLINIC | Facility: CLINIC | Age: 69
End: 2024-01-18
Payer: MEDICARE

## 2024-01-18 ENCOUNTER — PATIENT MESSAGE (OUTPATIENT)
Dept: PRIMARY CARE CLINIC | Facility: CLINIC | Age: 69
End: 2024-01-18

## 2024-01-18 DIAGNOSIS — E78.2 MIXED HYPERLIPIDEMIA: ICD-10-CM

## 2024-01-18 DIAGNOSIS — N18.31 CHRONIC KIDNEY DISEASE, STAGE 3A: ICD-10-CM

## 2024-01-18 DIAGNOSIS — F17.200 CURRENT SMOKER: ICD-10-CM

## 2024-01-18 DIAGNOSIS — R73.03 PREDIABETES: ICD-10-CM

## 2024-01-18 DIAGNOSIS — J44.9 CHRONIC OBSTRUCTIVE PULMONARY DISEASE, UNSPECIFIED COPD TYPE: ICD-10-CM

## 2024-01-18 DIAGNOSIS — E89.0 POSTABLATIVE HYPOTHYROIDISM: Primary | ICD-10-CM

## 2024-01-18 PROBLEM — E11.9 TYPE 2 DIABETES MELLITUS WITHOUT COMPLICATION, WITHOUT LONG-TERM CURRENT USE OF INSULIN: Status: ACTIVE | Noted: 2024-01-18

## 2024-01-18 PROCEDURE — 99215 OFFICE O/P EST HI 40 MIN: CPT | Mod: 95,,, | Performed by: FAMILY MEDICINE

## 2024-01-18 NOTE — PROGRESS NOTES
TELEMEDICINE VISIT        [facility]       [address] [place] [address]      [phone]     Mary Garcia MD         .        Amy Charles is a 68 y.o. female who presents for Telemed visit.   The patient location is: [place]    The reason for telemedicine visit: patient is here for lab follow up- has not been doing well with diet and gets in rut during the winter holidays.  Has more depressive symptoms typical of the season.  She is also nto taking throid supplements liek she was, and she does feel the difference since not taking.  She still takes xanax for anxiety and thinks this does help. Pain for back has also started coming back and will likely need more pain meds- only takes xanax.  Patient is also needing referral to ENT for possible hearing aids. Patient is sensitive ot changes in thyroid and only feels better on slightly   Overactive thyroid.     REVIEW OF SYMPTOMS  All negative except as stated above.      PHYSICAL EXAM:  Alert and awake, Normal appearance, and Fatigued appearing  Normocephalic, Atraumatic , and Normal facies  EOMI intact and Clear conjunctivae exophthalmous    No cyanosis and No labored breathing  Normal Abdomen, No tenderness/guarding, and Increased Abdominal Girth  Limited Range of Motion  Grossly intact, No tremors observed, and No tics observed  Normal mood, Normal affect, Normal behavior, Congruent sppech, Normal memory, Normal speech, and concerned  Normal Skin and Normal Hair    ASSESSMENT AND PLAN      1. Postablative hypothyroidism  TSH is normal- did take t3 day of labs- ok to self adjust thryoid meds as patient is very sensitive to her needs.  Will monitor for now- patient will fu with eye doctor.   2. Chronic obstructive pulmonary disease, unspecified COPD type  Still smoking- doesn't take any meds- understands importance  3. Chronic kidney disease, stage 3a  Sligtht worse - admits to not drinking  "water. No longer taking diuretics  4. Current smoker  couneled  5. Prediabetes  Overview:  Pathophysiology of insulin resistance discussed with patient and therapeutic options were explained.  Dietary and lifestyle changes are recommended for the treatment of insulin resistance.   Low carbohydrate diet and/or possible intermittent fasting is recommended for insulin resistance and/or prediabetes.  Non FDA approved treatment options include but is not limited to GLP's, SGLT-2's, biguanides, and other glucose support supplements. Patient will notify us with any concerns or complaints regarding treatment plan.Weight loss is strongly encouraged and is emphasized to help reduce risk of diabetes.    On metformin- stable for now    6. Mixed hyperlipidemia  Elevated- patient reluctant to take meds- wants to work on diet for now and then reassess.  Understands risk associated with elevated lipids untreated          I spent a total of 45 minutes face to face and non-face to face on the date of this visit.This includes time preparing to see the patient (eg, review of tests, notes), obtaining and/or reviewing additional history from an independent historian and/or outside medical records, documenting clinical information in the electronic health record, independently interpreting results and/or communicating results to the patient/family/caregiver, or care coordinator.    Disclaimer: Portions of this record may have been created with voice recognition software. Occasional wrong-word or "sound-a-like" substitutions may have occurred due to inherent limitations of voice recognition software. Read the chart carefully and recognize, using context, where substitutions have occurred."    Visit type: Telemedicine:audio and visual  Each patient to whom he or she provides medical services by telemedicine is:  (1) informed of the relationship between the physician and patient and the respective role of any other health care provider with " respect to management of the patient; and (2) notified that he or she may decline to receive medical services by telemedicine and may withdraw from such care at any time.    Signed by:  Mary Garcia MD

## 2024-03-06 ENCOUNTER — PATIENT MESSAGE (OUTPATIENT)
Dept: PRIMARY CARE CLINIC | Facility: CLINIC | Age: 69
End: 2024-03-06
Payer: COMMERCIAL

## 2024-03-06 RX ORDER — PHENTERMINE HYDROCHLORIDE 37.5 MG/1
TABLET ORAL
Qty: 30 TABLET | Refills: 2 | Status: CANCELLED | OUTPATIENT
Start: 2024-03-06

## 2024-03-06 NOTE — TELEPHONE ENCOUNTER
----- Message from Debbie Nassar sent at 3/6/2024 12:19 PM CST -----  Patient would like to schedule her 4 month follow up appointment. Patient stated she have been trying since January to schedule an appointment and she is worried about her levels. Call back number is [phone]. Thx.EL

## 2024-03-06 NOTE — TELEPHONE ENCOUNTER
Spoke with pt and advised her that Dr. Garcia scheduled is not open at this time. Pt verbalized understanding

## 2024-03-07 RX ORDER — PHENTERMINE HYDROCHLORIDE 37.5 MG/1
TABLET ORAL
Qty: 30 TABLET | Refills: 2 | Status: SHIPPED | OUTPATIENT
Start: 2024-03-07 | End: 2024-06-10 | Stop reason: SDUPTHER

## 2024-03-18 RX ORDER — ALPRAZOLAM 0.5 MG/1
TABLET, EXTENDED RELEASE ORAL
Qty: 45 TABLET | Refills: 2 | Status: SHIPPED | OUTPATIENT
Start: 2024-03-18 | End: 2024-04-12 | Stop reason: SDUPTHER

## 2024-03-21 ENCOUNTER — TELEPHONE (OUTPATIENT)
Dept: PRIMARY CARE CLINIC | Facility: CLINIC | Age: 69
End: 2024-03-21
Payer: COMMERCIAL

## 2024-03-21 NOTE — TELEPHONE ENCOUNTER
Patient needs labs sent to [facility] for her 4 month follow-up with a virtual with Dr. Medina

## 2024-03-21 NOTE — TELEPHONE ENCOUNTER
----- Message from Norma Ramsey sent at 3/21/2024 12:06 PM CDT -----  Contact: p[t  Pt is calling in regard to her needing appt barrie dinh/dr chappell.  Please call her back at [phone]  thanks/mpd   (no solutions in book it)

## 2024-04-05 ENCOUNTER — TELEPHONE (OUTPATIENT)
Dept: PRIMARY CARE CLINIC | Facility: CLINIC | Age: 69
End: 2024-04-05
Payer: COMMERCIAL

## 2024-04-05 DIAGNOSIS — E89.0 POSTABLATIVE HYPOTHYROIDISM: ICD-10-CM

## 2024-04-05 RX ORDER — LIOTHYRONINE SODIUM 25 UG/1
TABLET ORAL
Qty: 30 TABLET | Refills: 5 | Status: SHIPPED | OUTPATIENT
Start: 2024-04-05

## 2024-04-05 NOTE — TELEPHONE ENCOUNTER
----- Message from Fátima Cook sent at 4/5/2024 11:39 AM CDT -----  Contact: self   Patient is calling to have orders placed for Lab work. She also needs these orders sent to [facility]. Please call to advise

## 2024-04-05 NOTE — TELEPHONE ENCOUNTER
Will this pt be going with you to your new specialty . If so this pt states she needs her labs ordered and sent to [facility]

## 2024-04-09 ENCOUNTER — PATIENT MESSAGE (OUTPATIENT)
Dept: PRIMARY CARE CLINIC | Facility: CLINIC | Age: 69
End: 2024-04-09
Payer: COMMERCIAL

## 2024-04-09 ENCOUNTER — TELEPHONE (OUTPATIENT)
Dept: PRIMARY CARE CLINIC | Facility: CLINIC | Age: 69
End: 2024-04-09
Payer: COMMERCIAL

## 2024-04-09 DIAGNOSIS — E03.9 HYPOTHYROIDISM, UNSPECIFIED TYPE: ICD-10-CM

## 2024-04-09 DIAGNOSIS — E28.0 HYPERESTROGENISM: ICD-10-CM

## 2024-04-09 DIAGNOSIS — Z86.39 HISTORY OF GRAVES' DISEASE: ICD-10-CM

## 2024-04-09 DIAGNOSIS — E05.00 GRAVES DISEASE: ICD-10-CM

## 2024-04-09 DIAGNOSIS — E78.5 HYPERLIPIDEMIA, UNSPECIFIED HYPERLIPIDEMIA TYPE: ICD-10-CM

## 2024-04-09 DIAGNOSIS — N18.2 STAGE 2 CHRONIC KIDNEY DISEASE: ICD-10-CM

## 2024-04-09 DIAGNOSIS — R73.03 PRE-DIABETES: Primary | ICD-10-CM

## 2024-04-09 DIAGNOSIS — E78.2 MIXED HYPERLIPIDEMIA: ICD-10-CM

## 2024-04-09 DIAGNOSIS — J44.9 CHRONIC OBSTRUCTIVE PULMONARY DISEASE, UNSPECIFIED COPD TYPE: ICD-10-CM

## 2024-04-09 DIAGNOSIS — E55.9 VITAMIN D DEFICIENCY: ICD-10-CM

## 2024-04-09 NOTE — TELEPHONE ENCOUNTER
----- Message from Shona Bonilla sent at 4/8/2024  3:48 PM CDT -----  Contact: Amy  Pt is calling in regards to wanting to know if she qualify to follow the doctor to her new specialty. Pt stated she really needs to see the doctor. Pt wants to know who will be putting in her lab orders. Pt have left a message previously but no call back and wants to speak with doctor only. Please call back at [phone]                            Thanks  KT

## 2024-04-10 ENCOUNTER — TELEPHONE (OUTPATIENT)
Dept: PRIMARY CARE CLINIC | Facility: CLINIC | Age: 69
End: 2024-04-10
Payer: COMMERCIAL

## 2024-04-10 DIAGNOSIS — E89.0 POSTABLATIVE HYPOTHYROIDISM: Primary | ICD-10-CM

## 2024-04-10 LAB
25(OH)D3+25(OH)D2 SERPL-MCNC: 69.3 NG/ML (ref 30–100)
ALBUMIN SERPL-MCNC: 4.1 G/DL (ref 3.9–4.9)
ALBUMIN/GLOB SERPL: 1.5 {RATIO} (ref 1.2–2.2)
ALP SERPL-CCNC: 81 IU/L (ref 44–121)
ALT SERPL-CCNC: 15 IU/L (ref 0–32)
AST SERPL-CCNC: 19 IU/L (ref 0–40)
BASOPHILS # BLD AUTO: 0.1 X10E3/UL (ref 0–0.2)
BASOPHILS NFR BLD AUTO: 1 %
BILIRUB SERPL-MCNC: <0.2 MG/DL (ref 0–1.2)
BUN SERPL-MCNC: 21 MG/DL (ref 8–27)
BUN/CREAT SERPL: 19 (ref 12–28)
CALCIUM SERPL-MCNC: 9.6 MG/DL (ref 8.7–10.3)
CHLORIDE SERPL-SCNC: 103 MMOL/L (ref 96–106)
CHOLEST SERPL-MCNC: 256 MG/DL (ref 100–199)
CO2 SERPL-SCNC: 21 MMOL/L (ref 20–29)
CREAT SERPL-MCNC: 1.08 MG/DL (ref 0.57–1)
EOSINOPHIL # BLD AUTO: 0.2 X10E3/UL (ref 0–0.4)
EOSINOPHIL NFR BLD AUTO: 2 %
ERYTHROCYTE [DISTWIDTH] IN BLOOD BY AUTOMATED COUNT: 12.6 % (ref 11.7–15.4)
EST. GFR  (NO RACE VARIABLE): 57 ML/MIN/1.73
GLOBULIN SER CALC-MCNC: 2.7 G/DL (ref 1.5–4.5)
GLUCOSE SERPL-MCNC: 86 MG/DL (ref 70–99)
HBA1C MFR BLD: 5.6 % (ref 4.8–5.6)
HCT VFR BLD AUTO: 40.1 % (ref 34–46.6)
HDLC SERPL-MCNC: 64 MG/DL
HGB BLD-MCNC: 12.6 G/DL (ref 11.1–15.9)
IMM GRANULOCYTES # BLD AUTO: 0 X10E3/UL (ref 0–0.1)
IMM GRANULOCYTES NFR BLD AUTO: 0 %
LDLC SERPL CALC-MCNC: 162 MG/DL (ref 0–99)
LYMPHOCYTES # BLD AUTO: 2.7 X10E3/UL (ref 0.7–3.1)
LYMPHOCYTES NFR BLD AUTO: 36 %
MCH RBC QN AUTO: 27.8 PG (ref 26.6–33)
MCHC RBC AUTO-ENTMCNC: 31.4 G/DL (ref 31.5–35.7)
MCV RBC AUTO: 88 FL (ref 79–97)
MONOCYTES # BLD AUTO: 0.6 X10E3/UL (ref 0.1–0.9)
MONOCYTES NFR BLD AUTO: 9 %
NEUTROPHILS # BLD AUTO: 3.9 X10E3/UL (ref 1.4–7)
NEUTROPHILS NFR BLD AUTO: 52 %
PLATELET # BLD AUTO: 342 X10E3/UL (ref 150–450)
POTASSIUM SERPL-SCNC: 4.9 MMOL/L (ref 3.5–5.2)
PROT SERPL-MCNC: 6.8 G/DL (ref 6–8.5)
RBC # BLD AUTO: 4.54 X10E6/UL (ref 3.77–5.28)
SODIUM SERPL-SCNC: 142 MMOL/L (ref 134–144)
T3FREE SERPL-MCNC: 2.1 PG/ML (ref 2–4.4)
T4 FREE SERPL-MCNC: 0.63 NG/DL (ref 0.82–1.77)
THYROPEROXIDASE AB SERPL-ACNC: 31 IU/ML (ref 0–34)
TRIGL SERPL-MCNC: 169 MG/DL (ref 0–149)
TSH SERPL DL<=0.005 MIU/L-ACNC: 0.6 UIU/ML (ref 0.45–4.5)
VLDLC SERPL CALC-MCNC: 30 MG/DL (ref 5–40)
WBC # BLD AUTO: 7.5 X10E3/UL (ref 3.4–10.8)

## 2024-04-12 ENCOUNTER — OFFICE VISIT (OUTPATIENT)
Dept: INTERNAL MEDICINE | Facility: CLINIC | Age: 69
End: 2024-04-12
Payer: COMMERCIAL

## 2024-04-12 VITALS
DIASTOLIC BLOOD PRESSURE: 66 MMHG | SYSTOLIC BLOOD PRESSURE: 128 MMHG | OXYGEN SATURATION: 98 % | RESPIRATION RATE: 18 BRPM | HEIGHT: 59 IN | HEART RATE: 96 BPM | BODY MASS INDEX: 23.69 KG/M2 | WEIGHT: 117.5 LBS

## 2024-04-12 DIAGNOSIS — E06.3 HASHIMOTO'S DISEASE: Primary | ICD-10-CM

## 2024-04-12 DIAGNOSIS — E89.0 POSTABLATIVE HYPOTHYROIDISM: ICD-10-CM

## 2024-04-12 DIAGNOSIS — F41.9 ANXIETY: ICD-10-CM

## 2024-04-12 DIAGNOSIS — M47.26 OSTEOARTHRITIS OF SPINE WITH RADICULOPATHY, LUMBAR REGION: ICD-10-CM

## 2024-04-12 DIAGNOSIS — E05.00 GRAVES' ORBITOPATHY: ICD-10-CM

## 2024-04-12 DIAGNOSIS — E55.9 VITAMIN D DEFICIENCY: ICD-10-CM

## 2024-04-12 DIAGNOSIS — J44.9 CHRONIC OBSTRUCTIVE PULMONARY DISEASE, UNSPECIFIED COPD TYPE: ICD-10-CM

## 2024-04-12 DIAGNOSIS — M85.80 OSTEOPENIA, UNSPECIFIED LOCATION: ICD-10-CM

## 2024-04-12 DIAGNOSIS — R73.03 PREDIABETES: ICD-10-CM

## 2024-04-12 DIAGNOSIS — E78.2 MIXED HYPERLIPIDEMIA: ICD-10-CM

## 2024-04-12 DIAGNOSIS — Z87.891 PERSONAL HISTORY OF NICOTINE DEPENDENCE: ICD-10-CM

## 2024-04-12 DIAGNOSIS — F17.200 CURRENT SMOKER: ICD-10-CM

## 2024-04-12 DIAGNOSIS — N18.31 CHRONIC KIDNEY DISEASE, STAGE 3A: ICD-10-CM

## 2024-04-12 PROBLEM — E11.9 TYPE 2 DIABETES MELLITUS WITHOUT COMPLICATION, WITHOUT LONG-TERM CURRENT USE OF INSULIN: Status: RESOLVED | Noted: 2024-01-18 | Resolved: 2024-04-12

## 2024-04-12 PROCEDURE — 99397 PER PM REEVAL EST PAT 65+ YR: CPT | Mod: S$PBB,GZ,, | Performed by: FAMILY MEDICINE

## 2024-04-12 PROCEDURE — 99999 PR PBB SHADOW E&M-EST. PATIENT-LVL V: CPT | Mod: PBBFAC,,, | Performed by: FAMILY MEDICINE

## 2024-04-12 PROCEDURE — 99215 OFFICE O/P EST HI 40 MIN: CPT | Mod: PBBFAC | Performed by: FAMILY MEDICINE

## 2024-04-12 RX ORDER — ALPRAZOLAM 0.5 MG/1
0.5 TABLET, EXTENDED RELEASE ORAL NIGHTLY
Qty: 45 TABLET | Refills: 2 | Status: SHIPPED | OUTPATIENT
Start: 2024-04-12 | End: 2024-06-18

## 2024-04-12 RX ORDER — LEVOTHYROXINE SODIUM 75 UG/1
TABLET ORAL
Qty: 90 TABLET | Refills: 0 | Status: SHIPPED | OUTPATIENT
Start: 2024-04-12 | End: 2024-04-12 | Stop reason: SDUPTHER

## 2024-04-12 RX ORDER — ROSUVASTATIN CALCIUM 10 MG/1
10 TABLET, COATED ORAL DAILY
Qty: 30 TABLET | Refills: 3 | Status: SHIPPED | OUTPATIENT
Start: 2024-04-12 | End: 2025-04-12

## 2024-04-12 RX ORDER — ALENDRONATE SODIUM 70 MG/1
70 TABLET ORAL
Qty: 4 TABLET | Refills: 11 | Status: SHIPPED | OUTPATIENT
Start: 2024-04-12 | End: 2025-04-12

## 2024-04-12 RX ORDER — LEVOTHYROXINE SODIUM 75 UG/1
75 TABLET ORAL
Qty: 90 TABLET | Refills: 0 | Status: SHIPPED | OUTPATIENT
Start: 2024-04-12

## 2024-04-12 NOTE — PROGRESS NOTES
"Subjective:       Patient ID: Amy Charles is a 68 y.o. female.    Chief Complaint: Establish Care    68-year-old female patient new to my clinic here to establish care previously seen by Dr. Garcia.   Patient with Patient Active Problem List:     Current smoker     Graves' orbitopathy     Anxiety     Hashimoto's disease     Vitamin D deficiency     Prediabetes     Mixed hyperlipidemia     Chronic kidney disease, stage 3a     Postablative hypothyroidism     Chronic obstructive pulmonary disease, unspecified COPD type     Osteoarthritis of spine with radiculopathy, lumbar region     Osteopenia     Personal history of nicotine dependence    Reports that she has been on current medication and would like to continue, as it has been extremely helpful with her symptoms as well as her labs.  Patient has been feeling better, and reports that secondary to ADD has been doing well on Adipex, to help her lose weight as well as with concentration as prior to that she was on Adderall.   Dr. Garcia has been prescribing Adipex to help her with attention concentration deficits as well  Denies any trouble with breathing  Has been smoking 1 pack of cigarettes daily for the past 30 years, denies of any chest tightness or shortness of breath or wheezing      Review of Systems   Constitutional:  Negative for fatigue.   Eyes:  Negative for visual disturbance.   Respiratory:  Negative for shortness of breath.    Cardiovascular:  Negative for chest pain and leg swelling.   Gastrointestinal:  Negative for abdominal pain, nausea and vomiting.   Musculoskeletal:  Negative for myalgias.   Skin:  Negative for rash.   Neurological:  Negative for light-headedness and headaches.   Psychiatric/Behavioral:  Positive for decreased concentration. Negative for sleep disturbance.          /66 (BP Location: Left arm, Patient Position: Sitting, BP Method: Medium (Manual))   Pulse 96   Resp 18   Ht 4' 11" (1.499 m)   Wt 53.3 kg (117 lb 8.1 oz)   " SpO2 98%   BMI 23.73 kg/m²   Objective:      Physical Exam  Constitutional:       Appearance: She is well-developed.   HENT:      Head: Normocephalic and atraumatic.   Eyes:      Comments: Positive for exophthalmos   Cardiovascular:      Rate and Rhythm: Normal rate and regular rhythm.      Heart sounds: Normal heart sounds. No murmur heard.  Pulmonary:      Effort: Pulmonary effort is normal.      Breath sounds: Normal breath sounds. No wheezing.   Abdominal:      General: Bowel sounds are normal.      Palpations: Abdomen is soft.      Tenderness: There is no abdominal tenderness.   Skin:     General: Skin is warm and dry.      Findings: No rash.   Neurological:      Mental Status: She is alert and oriented to person, place, and time.   Psychiatric:         Mood and Affect: Mood normal.         Telephone on 04/09/2024   Component Date Value Ref Range Status    Cholesterol 04/09/2024 256 (H)  100 - 199 mg/dL Final    Triglycerides 04/09/2024 169 (H)  0 - 149 mg/dL Final    HDL 04/09/2024 64  >39 mg/dL Final    VLDL Cholesterol Jewel 04/09/2024 30  5 - 40 mg/dL Final    LDL Calculated 04/09/2024 162 (H)  0 - 99 mg/dL Final    Glucose 04/09/2024 86  70 - 99 mg/dL Final    BUN 04/09/2024 21  8 - 27 mg/dL Final    Creatinine 04/09/2024 1.08 (H)  0.57 - 1.00 mg/dL Final    eGFR 04/09/2024 57 (L)  >59 mL/min/1.73 Final    BUN/Creatinine Ratio 04/09/2024 19  12 - 28 Final    Sodium 04/09/2024 142  134 - 144 mmol/L Final    Potassium 04/09/2024 4.9  3.5 - 5.2 mmol/L Final    Chloride 04/09/2024 103  96 - 106 mmol/L Final    CO2 04/09/2024 21  20 - 29 mmol/L Final    Calcium 04/09/2024 9.6  8.7 - 10.3 mg/dL Final    Protein, Total 04/09/2024 6.8  6.0 - 8.5 g/dL Final    Albumin 04/09/2024 4.1  3.9 - 4.9 g/dL Final    Globulin, Total 04/09/2024 2.7  1.5 - 4.5 g/dL Final    Albumin/Globulin Ratio 04/09/2024 1.5  1.2 - 2.2 Final    Total Bilirubin 04/09/2024 <0.2  0.0 - 1.2 mg/dL Final    Alkaline Phosphatase 04/09/2024 81  44 -  121 IU/L Final    AST 04/09/2024 19  0 - 40 IU/L Final    ALT 04/09/2024 15  0 - 32 IU/L Final    WBC 04/09/2024 7.5  3.4 - 10.8 x10E3/uL Final    RBC 04/09/2024 4.54  3.77 - 5.28 x10E6/uL Final    Hemoglobin 04/09/2024 12.6  11.1 - 15.9 g/dL Final    Hematocrit 04/09/2024 40.1  34.0 - 46.6 % Final    MCV 04/09/2024 88  79 - 97 fL Final    MCH 04/09/2024 27.8  26.6 - 33.0 pg Final    MCHC 04/09/2024 31.4 (L)  31.5 - 35.7 g/dL Final    RDW 04/09/2024 12.6  11.7 - 15.4 % Final    Platelets 04/09/2024 342  150 - 450 x10E3/uL Final    Neutrophils 04/09/2024 52  Not Estab. % Final    Lymphs 04/09/2024 36  Not Estab. % Final    Monocytes 04/09/2024 9  Not Estab. % Final    Eos 04/09/2024 2  Not Estab. % Final    Basos 04/09/2024 1  Not Estab. % Final    Neutrophils (Absolute) 04/09/2024 3.9  1.4 - 7.0 x10E3/uL Final    Lymphs (Absolute) 04/09/2024 2.7  0.7 - 3.1 x10E3/uL Final    Monocytes(Absolute) 04/09/2024 0.6  0.1 - 0.9 x10E3/uL Final    Eos (Absolute) 04/09/2024 0.2  0.0 - 0.4 x10E3/uL Final    Baso (Absolute) 04/09/2024 0.1  0.0 - 0.2 x10E3/uL Final    Immature Granulocytes 04/09/2024 0  Not Estab. % Final    Immature Grans (Abs) 04/09/2024 0.0  0.0 - 0.1 x10E3/uL Final    Hemoglobin A1c 04/09/2024 5.6  4.8 - 5.6 % Final    Comment:          Prediabetes: 5.7 - 6.4           Diabetes: >6.4           Glycemic control for adults with diabetes: <7.0      Thyroid Peroxidase Ab 04/09/2024 31  0 - 34 IU/mL Final    T3, Free 04/09/2024 2.1  2.0 - 4.4 pg/mL Final    T4, Free 04/09/2024 0.63 (L)  0.82 - 1.77 ng/dL Final    TSH 04/09/2024 0.601  0.450 - 4.500 uIU/mL Final    Vit D, 25-Hydroxy 04/09/2024 69.3  30.0 - 100.0 ng/mL Final    Comment: Vitamin D deficiency has been defined by the [place] of  [employer] and an Endocrine Society practice guideline as a  level of serum 25-OH vitamin D less than 20 ng/mL (1,2).  The Endocrine Society went on to further define vitamin D  insufficiency as a level between 21 and 29 ng/mL  (2).  1. IOM ([employer]). 2010. Dietary reference     intakes for calcium and D. [place]: The     National Academies Press.  2. Kori MF, Aixa TANG, Talya AMBRIZ, et al.     Evaluation, treatment, and prevention of vitamin D     deficiency: an Endocrine Society clinical practice     guideline. JCEM. 2011 Jul; 96(7):1911-30.         Assessment/Plan:   1. Hashimoto's disease  Overview:  Alternate remedies that can help with inflammation associated with Hashimoto's include such things such as Selenium 200mg supplementation, adhering to a gluten free diet as well as adding LDN as a non - FDA approved option for therapy. Patient should comply with taking medications as directed. Patient should take thyroid meds on empty stomach and avoid taking with iron, calcium, zinc and fiber.  Potential risks are associated with suppressing TSH (increased arrhythmia and bone loss) as emphasis is on improving patient symptoms. Patient should notify us if any symptoms occur suggesting  overactive thyroid (fast heart rate, anxiety, sleeplessness, and tremors).   2. Graves' orbitopathy  3. Postablative hypothyroidism    Reviewed recent labs which looks stable currently taking Cytomel 25 mcg p.o. daily and Synthroid 75 mcg p.o. daily    4. Prediabetes  Overview:  Pathophysiology of insulin resistance discussed with patient and therapeutic options were explained.  Dietary and lifestyle changes are recommended for the treatment of insulin resistance.   Low carbohydrate diet and/or possible intermittent fasting is recommended for insulin resistance and/or prediabetes.  Non FDA approved treatment options include but is not limited to GLP's, SGLT-2's, biguanides, and other glucose support supplements. Patient will notify us with any concerns or complaints regarding treatment plan.Weight loss is strongly encouraged and is emphasized to help reduce risk of diabetes.    Currently taking metformin 1000 mg twice daily as  needed  Reviewed recent labs and noted that patient can try taking 500 mg twice daily    5. Vitamin D deficiency  Encouraged to take over-the-counter vitamin-D supplements    6. Anxiety  Overview:  It is advised to identify triggers for anxiety and consider the impact of anxious thinking on functioning. Discussed strategies to regulate symptoms and need for compliance with treatment.  Therapy or counseling may be necessary if current regimen is ineffective. Patient will report any worsening or change in anxiety symptoms if necessary.    Stable on Xanax as needed    7. Chronic kidney disease, stage 3a  Encouraged to drink adequate fluids    8. Chronic obstructive pulmonary disease, unspecified COPD type  Stable    9. Current smoker  10. Personal history of nicotine dependence  -     CT Chest Lung Screening Low Dose; Future; Expected date: 04/12/2024  Encouraged to quit smoking  Will get baseline low-dose CT scan for further evaluation    11. Osteopenia, unspecified location  -     alendronate (FOSAMAX) 70 MG tablet; Take 1 tablet (70 mg total) by mouth every 7 days.  Dispense: 4 tablet; Refill: 11  Reviewed DEXA scan showing osteopenia with high fracture risk  Will get started on Fosamax 70 mg weekly    12. Osteoarthritis of spine with radiculopathy, lumbar region  Followed by pain management Dr. Medrano currently taking Norco    13. Mixed hyperlipidemia  Overview:  Reviewed importance of advanced lipid profiles. Advise daily exercise. Diet is recommended. Patients are encouraged to obtain healthy BMI weight. Risks associated with high cholesterol are well established and include but are not limited to heart disease, stroke and peripheral vascular disease. Patient should not smoke. Goal LDL particle number is <1000 and ApoB <70. Basic LDL is below 100 or below 70 if diabetic. Some non-FDA approved dietary supplements that may be beneficial to patient include but is not limited to high fiber diet at least 30g daily;  niacin ER non flush free variety 500mg-1,000mg; Omega-3 fish oil DHA+EPA = 1,000mg twice daily; baby dose aspirin 81mg; co-enzyme q10 500mg to help reduce statin-induced myalgia; red rice yeast 1200mg twice daily; berberine 1000mg-2000mg daily. Patient is encouraged to exercise routinely and adhere to heart healthy diet with Mediterranean diet showing the most consistent data to help with lipid management.     Orders:  -     rosuvastatin (CRESTOR) 10 MG tablet; Take 1 tablet (10 mg total) by mouth once daily.  Dispense: 30 tablet; Refill: 3  Will get started on Crestor 10 mg daily secondary to elevated cholesterol levels and ASCVD score at 9.5%    Patient reports that she has been able to keep the weight off as well as help with attention concentration with phentermine and would like to continue this dose, informed that I would discuss with Dr. Garcia

## 2024-04-22 ENCOUNTER — TELEPHONE (OUTPATIENT)
Dept: FAMILY MEDICINE | Facility: CLINIC | Age: 69
End: 2024-04-22
Payer: COMMERCIAL

## 2024-04-22 DIAGNOSIS — E05.00 GRAVES' ORBITOPATHY: Primary | ICD-10-CM

## 2024-04-22 DIAGNOSIS — E06.3 HASHIMOTO'S DISEASE: ICD-10-CM

## 2024-05-06 DIAGNOSIS — M19.90 OSTEOARTHRITIS, UNSPECIFIED OSTEOARTHRITIS TYPE, UNSPECIFIED SITE: ICD-10-CM

## 2024-05-07 RX ORDER — MELOXICAM 15 MG/1
15 TABLET ORAL DAILY
Qty: 90 TABLET | Refills: 0 | Status: SHIPPED | OUTPATIENT
Start: 2024-05-07

## 2024-05-08 ENCOUNTER — TELEPHONE (OUTPATIENT)
Dept: INTERNAL MEDICINE | Facility: CLINIC | Age: 69
End: 2024-05-08
Payer: COMMERCIAL

## 2024-05-08 ENCOUNTER — HOSPITAL ENCOUNTER (OUTPATIENT)
Dept: RADIOLOGY | Facility: HOSPITAL | Age: 69
Discharge: HOME | End: 2024-05-08
Attending: FAMILY MEDICINE
Payer: MEDICARE

## 2024-05-08 DIAGNOSIS — R91.8 OTHER NONSPECIFIC ABNORMAL FINDING OF LUNG FIELD: Primary | ICD-10-CM

## 2024-05-08 DIAGNOSIS — Z87.891 PERSONAL HISTORY OF NICOTINE DEPENDENCE: ICD-10-CM

## 2024-05-08 DIAGNOSIS — J43.9 PULMONARY EMPHYSEMA, UNSPECIFIED EMPHYSEMA TYPE: ICD-10-CM

## 2024-05-08 PROCEDURE — 71271 CT THORAX LUNG CANCER SCR C-: CPT | Mod: 26,,, | Performed by: RADIOLOGY

## 2024-05-08 PROCEDURE — 71271 CT THORAX LUNG CANCER SCR C-: CPT | Mod: TC,PN

## 2024-05-08 NOTE — TELEPHONE ENCOUNTER
Low dose CT scan of the chest shows scarring and granuloma and emphysema changes with tiny nodules, will plan to repeat six-month low-dose CT scan but will also refer to pulmonary for further evaluation

## 2024-05-09 ENCOUNTER — PATIENT MESSAGE (OUTPATIENT)
Dept: PULMONOLOGY | Facility: CLINIC | Age: 69
End: 2024-05-09
Payer: COMMERCIAL

## 2024-06-05 DIAGNOSIS — R53.83 FATIGUE, UNSPECIFIED TYPE: ICD-10-CM

## 2024-06-06 RX ORDER — PHENTERMINE HYDROCHLORIDE 37.5 MG/1
TABLET ORAL
Qty: 30 TABLET | Refills: 2 | OUTPATIENT
Start: 2024-06-06

## 2024-06-06 RX ORDER — CYANOCOBALAMIN 1000 UG/ML
INJECTION, SOLUTION INTRAMUSCULAR; SUBCUTANEOUS
Qty: 4 ML | Refills: 5 | Status: SHIPPED | OUTPATIENT
Start: 2024-06-06

## 2024-06-07 ENCOUNTER — PATIENT MESSAGE (OUTPATIENT)
Dept: INTERNAL MEDICINE | Facility: CLINIC | Age: 69
End: 2024-06-07
Payer: COMMERCIAL

## 2024-06-07 DIAGNOSIS — R73.03 PREDIABETES: Primary | ICD-10-CM

## 2024-06-10 RX ORDER — PHENTERMINE HYDROCHLORIDE 37.5 MG/1
37.5 TABLET ORAL
Qty: 30 TABLET | Refills: 2 | Status: SHIPPED | OUTPATIENT
Start: 2024-06-10

## 2024-06-18 RX ORDER — ALPRAZOLAM 0.5 MG/1
TABLET, EXTENDED RELEASE ORAL
Qty: 60 TABLET | Refills: 0 | Status: SHIPPED | OUTPATIENT
Start: 2024-06-18

## 2024-07-09 ENCOUNTER — OFFICE VISIT (OUTPATIENT)
Dept: PRIMARY CARE CLINIC | Facility: CLINIC | Age: 69
End: 2024-07-09
Payer: MEDICARE

## 2024-07-09 VITALS
SYSTOLIC BLOOD PRESSURE: 116 MMHG | BODY MASS INDEX: 22.4 KG/M2 | HEIGHT: 59 IN | WEIGHT: 111.13 LBS | HEART RATE: 96 BPM | DIASTOLIC BLOOD PRESSURE: 62 MMHG

## 2024-07-09 DIAGNOSIS — M19.90 OSTEOARTHRITIS, UNSPECIFIED OSTEOARTHRITIS TYPE, UNSPECIFIED SITE: ICD-10-CM

## 2024-07-09 DIAGNOSIS — R21 RASH: ICD-10-CM

## 2024-07-09 DIAGNOSIS — E89.0 POSTABLATIVE HYPOTHYROIDISM: ICD-10-CM

## 2024-07-09 DIAGNOSIS — F41.9 ANXIETY: ICD-10-CM

## 2024-07-09 DIAGNOSIS — R73.03 PREDIABETES: ICD-10-CM

## 2024-07-09 DIAGNOSIS — E78.2 MIXED HYPERLIPIDEMIA: ICD-10-CM

## 2024-07-09 DIAGNOSIS — Z76.89 ENCOUNTER TO ESTABLISH CARE: Primary | ICD-10-CM

## 2024-07-09 DIAGNOSIS — I10 PRIMARY HYPERTENSION: ICD-10-CM

## 2024-07-09 DIAGNOSIS — R53.83 FATIGUE, UNSPECIFIED TYPE: ICD-10-CM

## 2024-07-09 PROCEDURE — G2211 COMPLEX E/M VISIT ADD ON: HCPCS | Mod: S$PBB,,, | Performed by: FAMILY MEDICINE

## 2024-07-09 PROCEDURE — 99999 PR PBB SHADOW E&M-EST. PATIENT-LVL III: CPT | Mod: PBBFAC,,, | Performed by: FAMILY MEDICINE

## 2024-07-09 PROCEDURE — 99215 OFFICE O/P EST HI 40 MIN: CPT | Mod: S$PBB,,, | Performed by: FAMILY MEDICINE

## 2024-07-09 PROCEDURE — 99213 OFFICE O/P EST LOW 20 MIN: CPT | Mod: PBBFAC,PN | Performed by: FAMILY MEDICINE

## 2024-07-09 RX ORDER — MELOXICAM 15 MG/1
15 TABLET ORAL DAILY
Qty: 90 TABLET | Refills: 1 | Status: SHIPPED | OUTPATIENT
Start: 2024-07-09

## 2024-07-09 RX ORDER — LEVOTHYROXINE SODIUM 75 UG/1
75 TABLET ORAL
Qty: 30 TABLET | Refills: 11 | Status: SHIPPED | OUTPATIENT
Start: 2024-07-09

## 2024-07-09 RX ORDER — METFORMIN HYDROCHLORIDE 500 MG/1
TABLET ORAL
Refills: 1 | OUTPATIENT
Start: 2024-07-09

## 2024-07-09 RX ORDER — TRIAMCINOLONE ACETONIDE 1 MG/ML
LOTION TOPICAL 2 TIMES DAILY
Qty: 60 ML | Refills: 11 | Status: SHIPPED | OUTPATIENT
Start: 2024-07-09

## 2024-07-09 RX ORDER — PHENTERMINE HYDROCHLORIDE 37.5 MG/1
37.5 TABLET ORAL
Qty: 30 TABLET | Refills: 2 | Status: SHIPPED | OUTPATIENT
Start: 2024-07-09

## 2024-07-09 RX ORDER — DEXTROAMPHETAMINE SACCHARATE, AMPHETAMINE ASPARTATE, DEXTROAMPHETAMINE SULFATE AND AMPHETAMINE SULFATE 2.5; 2.5; 2.5; 2.5 MG/1; MG/1; MG/1; MG/1
1 TABLET ORAL 2 TIMES DAILY
COMMUNITY
Start: 2024-07-08

## 2024-07-09 RX ORDER — TRIAMTERENE AND HYDROCHLOROTHIAZIDE 37.5; 25 MG/1; MG/1
1 CAPSULE ORAL EVERY MORNING
Qty: 30 CAPSULE | Refills: 11 | Status: SHIPPED | OUTPATIENT
Start: 2024-07-09

## 2024-07-09 RX ORDER — TRIAMTERENE AND HYDROCHLOROTHIAZIDE 37.5; 25 MG/1; MG/1
1 CAPSULE ORAL EVERY MORNING
COMMUNITY
End: 2024-07-09 | Stop reason: SDUPTHER

## 2024-07-09 RX ORDER — HYDROCODONE BITARTRATE AND ACETAMINOPHEN 10; 325 MG/1; MG/1
0.5 TABLET ORAL 2 TIMES DAILY
COMMUNITY

## 2024-07-09 RX ORDER — METFORMIN HYDROCHLORIDE 500 MG/1
1000 TABLET ORAL 2 TIMES DAILY
Qty: 360 TABLET | Refills: 1 | Status: SHIPPED | OUTPATIENT
Start: 2024-07-09

## 2024-07-09 RX ORDER — LIOTHYRONINE SODIUM 25 UG/1
TABLET ORAL
Qty: 30 TABLET | Refills: 5 | Status: SHIPPED | OUTPATIENT
Start: 2024-07-09

## 2024-07-09 RX ORDER — CYANOCOBALAMIN 1000 UG/ML
1000 INJECTION, SOLUTION INTRAMUSCULAR; SUBCUTANEOUS WEEKLY
Qty: 4 ML | Refills: 5 | Status: SHIPPED | OUTPATIENT
Start: 2024-07-09

## 2024-07-09 RX ORDER — ALPRAZOLAM 0.5 MG/1
TABLET, EXTENDED RELEASE ORAL
Qty: 45 TABLET | Refills: 5 | Status: SHIPPED | OUTPATIENT
Start: 2024-07-09

## 2024-07-09 NOTE — PROGRESS NOTES
"    [facility]       [address] [place] [address]      Phone: [phone]      Fax: [phone]    Mauro Medina MD                Office Visit  07/09/2024        Subjective      HPI:  Amy Charles is a 68 y.o. female presents today in clinic for "Establish Care  ."     68-year-old female presents today to establish care, issues.      Patient states that her previous PCP, Dr. Garcia, left the system.  Needs to get reestablished.    States she is multiple issues, took her many years to get established with Dr. Garcia and to get her symptoms under control.    Has extensive Endocrine and thyroid issues, including Hashimoto's, Graves, hyperthyroidism, adrenal insufficiency, fatigue, estrogen issues, other autoimmune disorders.  When Dr. Garcia left the practice, she did refer her to Dr. Villanueva at [facility].  She had one visit over there, but only saw Dr. Pam REES.  Supposed to have another visit soon with Dr. Villanueva herself.  In the meantime, needs refills on some of her medicines.  Currently takes brand-name Synthroid 75 mcg daily, liothyronine 25 mg (1/2 tablet, twice a day).    Has prediabetes.  Takes metformin for this.  Helps keep sugar under control.  Also helps with constipation issues.  Additionally, watches diet by using phentermine daily.  No issues with this medication.      She also has ADHD.  Prior to the phentermine, she was taking Adderall 10 mg.  When she switched to the phentermine, she found that controlled appetite and worked better for her ADHD symptoms.  She also has other back issues (see below), so when they go to do a procedure, they want her off the phentermine for 5-7 days.  During this time, she will switch to Adderall to help with focus.  Then, after the procedure, she goes back to the phentermine.    Ongoing issues with anxiety.  Takes Xanax 0.5 mg, one or 1.5 tablets per day.  Generally keeps her anxiety under control.  "     Has issues with arthritis, chronic back pains, lumbar pains.  Follows with back specialists, pain management.  Pain management has her on Norco 10.  She takes 1/2 tablet twice a day.  Supplements with meloxicam.  Has had multiple procedures done on her back.    Has HLD.  Not currently on any medications.    Has HTN.  Uses triamterene-HCTZ daily.  Blood pressure generally under good control.    She also mentions that, in the past, she has tested positive for antibodies to HCV, HBV.  Viral load has always been negative.    PMH:  HTN.  HLD.  Prediabetes.  Thyroid issues.  Anxiety.  Chronic back pains.  ADHD.  PSH:  Multiple back procedures.  Breast implant/removal.  Colectomy.  Tonsils.  F MH: CAD.  Dementia.  Breast cancer.    Allergies: Multiple, see epic.    Social:  Retired.  Previously on disability.    T: One ppd times 50+ years.    A: Socially   D: Denies     Exercise: Stays active, when she can.      GI: Dr. Sylvester  Pain: Dr. Marcelino  Back: Dr. Medrano/Yeni  Endo: Dr. Villanueva at [place]    MM/15/2024  Colon:  2024.  Dr. Sylvester.  Repeat five years ().            The following were updated and reviewed by myself in the chart: medications, past medical history, past surgical history, family history, social history, and allergies.     Medications:  Current Outpatient Medications on File Prior to Visit   Medication Sig Dispense Refill    alendronate (FOSAMAX) 70 MG tablet Take 1 tablet (70 mg total) by mouth every 7 days. 4 tablet 11    dextroamphetamine-amphetamine 10 mg Tab Take 1 tablet by mouth 2 (two) times daily.      [DISCONTINUED] ALPRAZolam (XANAX XR) 0.5 MG Tb24 TAKE ONE (1) TABLET (0.5 MG TOTAL) BY MOUTH 2 (TWO) TIMES DAILY as NEEDED (ANXIETY). 60 tablet 0    [DISCONTINUED] cyanocobalamin 1,000 mcg/mL injection INJECT 1ML INTO THE MUSCLE ONCE A WEEK 4 mL 5    [DISCONTINUED] HYDROcodone-acetaminophen (NORCO)  mg per tablet Take 0.5-1 tablets by mouth 3 (three) times daily.       [DISCONTINUED] ketoconazole (NIZORAL) 2 % cream APPLY TOPICALLY ONCE DAILY. 30 g 0    [DISCONTINUED] lansoprazole (PREVACID) 30 MG capsule Take by mouth daily as needed.       [DISCONTINUED] liothyronine (CYTOMEL) 25 MCG Tab TAKE HALF (1/2) TABLET BY MOUTH TWICE DAILY as DIRECTED 30 tablet 5    [DISCONTINUED] meloxicam (MOBIC) 15 MG tablet Take 1 tablet (15 mg total) by mouth once daily. 90 tablet 0    [DISCONTINUED] metFORMIN (GLUCOPHAGE) 500 MG tablet TAKE 2 TABLETS (1,000 MG TOTAL) BY MOUTH 2 (TWO) TIMES DAILY. 360 tablet 1    [DISCONTINUED] mupirocin (BACTROBAN) 2 % ointment APPLY TOPICALLY 3 TIMES A DAY 22 g 2    [DISCONTINUED] phentermine (ADIPEX-P) 37.5 mg tablet Take 1 tablet (37.5 mg total) by mouth before breakfast. 30 tablet 2    [DISCONTINUED] RESTASIS 0.05 % ophthalmic emulsion PLACE 1 drop IN EACH EYE TWICE DAILY      [DISCONTINUED] SYNTHROID 75 mcg tablet Take 1 tablet (75 mcg total) by mouth before breakfast. 90 tablet 0    [DISCONTINUED] triamcinolone acetonide 0.1% (KENALOG) 0.1 % cream Apply topically 2 (two) times daily. 80 g 0    [DISCONTINUED] triamterene-hydrochlorothiazide 37.5-25 mg (DYAZIDE) 37.5-25 mg per capsule Take 1 capsule by mouth every morning.      HYDROcodone-acetaminophen (NORCO)  mg per tablet Take 0.5 tablets by mouth 2 (two) times a day.      [DISCONTINUED] rosuvastatin (CRESTOR) 10 MG tablet Take 1 tablet (10 mg total) by mouth once daily. 30 tablet 3     No current facility-administered medications on file prior to visit.        PMHx:  Past Medical History:   Diagnosis Date    Anxiety     BRCA gene mutation negative     BRCA1 negative     BRCA2 negative     Chronic bronchitis     DDD (degenerative disc disease)     Fatigue     Fibromyalgia     Graves' disease with exophthalmos     Hashimoto's disease     Hepatitis B carrier     Hyperthyroidism     Prediabetes 05/18/2023    Scotoma involving central area     Tinnitus of both ears       Patient Active Problem List     Diagnosis Date Noted    Osteoarthritis of spine with radiculopathy, lumbar region 04/12/2024    Osteopenia 04/12/2024    Personal history of nicotine dependence 04/12/2024    Chronic obstructive pulmonary disease, unspecified COPD type 01/18/2024    Chronic kidney disease, stage 3a 09/18/2023    Postablative hypothyroidism 09/18/2023    Anxiety 05/18/2023    Hashimoto's disease 05/18/2023    Vitamin D deficiency 05/18/2023    Prediabetes 05/18/2023    Mixed hyperlipidemia 05/18/2023    Graves' orbitopathy 07/26/2016    Current smoker 10/27/2014        PSHx:  Past Surgical History:   Procedure Laterality Date    AUGMENTATION OF BREAST Bilateral 1979    Replaced 1998- Removed 2014    CATARACT EXTRACTION Bilateral 03/2015    ENDOMETRIAL ABLATION      HYSTEROSCOPY      LOOP ELECTROSURGICAL EXCISION PROCEDURE (LEEP)      THYROIDECTOMY      TONSILLECTOMY Bilateral 1965    TUBAL LIGATION Bilateral 1990        FHx:  Family History   Problem Relation Name Age of Onset    Graves' disease Mother      Breast cancer Mother  60    Cataracts Mother      Osteoporosis Mother      Thyroid disease Father      Heart disease Father      Cataracts Father      Hypertension Father      Pancreatic cancer Maternal Aunt      Ovarian cancer Paternal Grandmother  35    Cirrhosis Paternal Grandfather      Breast cancer Cousin  35    Breast cancer Sister          Social:  Social History     Socioeconomic History    Marital status:    Tobacco Use    Smoking status: Every Day     Current packs/day: 1.00     Average packs/day: 1 pack/day for 32.5 years (32.5 ttl pk-yrs)     Types: Cigarettes     Start date: 1/1/1992    Smokeless tobacco: Never   Substance and Sexual Activity    Alcohol use: Yes     Comment: Drinks on special occasions, approximately 4-5 times a year.    Drug use: No    Sexual activity: Not Currently     Partners: Male     Birth control/protection: Post-menopausal     Comment: TL        Allergies:  Review of patient's  "allergies indicates:   Allergen Reactions    Cefuroxime axetil      Other reaction(s): RASH AND ITCHING    Codeine Other (See Comments)     Other reaction(s): Itching    Penicillin Other (See Comments)    Penicillins      Other reaction(s): Unknown    Sulfa (sulfonamide antibiotics) Other (See Comments)     Other reaction(s): Unknown    Sulfamethoxazole-trimethoprim Other (See Comments)    Tuna oil     Wasp sting [allergen ext-venom-honey bee]     Wasp venom Other (See Comments)        ROS:  Review of Systems   Constitutional:  Negative for activity change, appetite change, chills and fever.   HENT:  Negative for congestion, postnasal drip, rhinorrhea, sore throat and trouble swallowing.    Respiratory:  Negative for cough, shortness of breath and wheezing.    Cardiovascular:  Negative for chest pain and palpitations.   Gastrointestinal:  Negative for abdominal pain, constipation, diarrhea, nausea and vomiting.   Genitourinary:  Negative for difficulty urinating.   Musculoskeletal:  Positive for arthralgias, back pain and myalgias.   Skin:  Negative for color change and rash.   Neurological:  Negative for speech difficulty and headaches.   Psychiatric/Behavioral:  The patient is nervous/anxious.    All other systems reviewed and are negative.         Objective      /62   Pulse 96   Ht 4' 11" (1.499 m)   Wt 50.4 kg (111 lb 1.8 oz)   BMI 22.44 kg/m²   Ht Readings from Last 3 Encounters:   07/09/24 4' 11" (1.499 m)   04/12/24 4' 11" (1.499 m)   02/14/24 4' 11" (1.499 m)     Wt Readings from Last 3 Encounters:   07/09/24 50.4 kg (111 lb 1.8 oz)   04/12/24 53.3 kg (117 lb 8.1 oz)   02/14/24 54 kg (119 lb)       PHYSICAL EXAM:  Physical Exam  Vitals and nursing note reviewed.   Constitutional:       General: She is not in acute distress.     Appearance: Normal appearance.   HENT:      Head: Normocephalic and atraumatic.      Right Ear: Tympanic membrane, ear canal and external ear normal.      Left Ear: Tympanic " membrane, ear canal and external ear normal.      Nose: Nose normal. No congestion or rhinorrhea.      Mouth/Throat:      Mouth: Mucous membranes are moist.      Pharynx: Oropharynx is clear. No oropharyngeal exudate or posterior oropharyngeal erythema.   Eyes:      Extraocular Movements: Extraocular movements intact.      Conjunctiva/sclera: Conjunctivae normal.      Pupils: Pupils are equal, round, and reactive to light.   Cardiovascular:      Rate and Rhythm: Normal rate and regular rhythm.   Pulmonary:      Effort: Pulmonary effort is normal. No respiratory distress.      Breath sounds: No wheezing, rhonchi or rales.   Musculoskeletal:         General: Normal range of motion.      Cervical back: Normal range of motion.   Lymphadenopathy:      Cervical: No cervical adenopathy.   Skin:     General: Skin is warm and dry.      Findings: No rash.   Neurological:      Mental Status: She is alert.              LABS / IMAGING:  Recent Results (from the past 4368 hour(s))   TSH    Collection Time: 01/10/24  2:17 PM   Result Value Ref Range    TSH 0.841 0.450 - 4.500 uIU/mL   T4, Free    Collection Time: 01/10/24  2:17 PM   Result Value Ref Range    T4, Free 0.75 (L) 0.82 - 1.77 ng/dL   T3, Free    Collection Time: 01/10/24  2:17 PM   Result Value Ref Range    T3, Free 5.8 (H) 2.0 - 4.4 pg/mL   Thyroid Peroxidase Antibody    Collection Time: 01/10/24  2:17 PM   Result Value Ref Range    Thyroid Peroxidase Ab 32 0 - 34 IU/mL   Hemoglobin A1C    Collection Time: 01/10/24  2:17 PM   Result Value Ref Range    Hemoglobin A1c 5.7 (H) 4.8 - 5.6 %   CBC Auto Differential    Collection Time: 01/10/24  2:17 PM   Result Value Ref Range    WBC 10.1 3.4 - 10.8 x10E3/uL    RBC 4.69 3.77 - 5.28 x10E6/uL    Hemoglobin 13.4 11.1 - 15.9 g/dL    Hematocrit 40.3 34.0 - 46.6 %    MCV 86 79 - 97 fL    MCH 28.6 26.6 - 33.0 pg    MCHC 33.3 31.5 - 35.7 g/dL    RDW 12.4 11.7 - 15.4 %    Platelets 401 150 - 450 x10E3/uL    Neutrophils 55 Not Estab.  %    Lymphs 35 Not Estab. %    Monocytes 7 Not Estab. %    Eos 2 Not Estab. %    Basos 1 Not Estab. %    Neutrophils (Absolute) 5.6 1.4 - 7.0 x10E3/uL    Lymphs (Absolute) 3.5 (H) 0.7 - 3.1 x10E3/uL    Monocytes(Absolute) 0.7 0.1 - 0.9 x10E3/uL    Eos (Absolute) 0.2 0.0 - 0.4 x10E3/uL    Baso (Absolute) 0.1 0.0 - 0.2 x10E3/uL    Immature Granulocytes 0 Not Estab. %    Immature Grans (Abs) 0.0 0.0 - 0.1 x10E3/uL   Comprehensive Metabolic Panel    Collection Time: 01/10/24  2:17 PM   Result Value Ref Range    Glucose 155 (H) 70 - 99 mg/dL    BUN 21 8 - 27 mg/dL    Creatinine 1.24 (H) 0.57 - 1.00 mg/dL    eGFR 48 (L) >59 mL/min/1.73    BUN/Creatinine Ratio 17 12 - 28    Sodium 141 134 - 144 mmol/L    Potassium 5.5 (H) 3.5 - 5.2 mmol/L    Chloride 101 96 - 106 mmol/L    CO2 20 20 - 29 mmol/L    Calcium 9.7 8.7 - 10.3 mg/dL    Protein, Total 6.8 6.0 - 8.5 g/dL    Albumin 4.3 3.9 - 4.9 g/dL    Globulin, Total 2.5 1.5 - 4.5 g/dL    Albumin/Globulin Ratio 1.7 1.2 - 2.2    Total Bilirubin <0.2 0.0 - 1.2 mg/dL    Alkaline Phosphatase 83 44 - 121 IU/L    AST 18 0 - 40 IU/L    ALT 15 0 - 32 IU/L   Lipid Panel    Collection Time: 01/10/24  2:17 PM   Result Value Ref Range    Cholesterol 268 (H) 100 - 199 mg/dL    Triglycerides 163 (H) 0 - 149 mg/dL    HDL 69 >39 mg/dL    VLDL Cholesterol Jewel 29 5 - 40 mg/dL    LDL Calculated 170 (H) 0 - 99 mg/dL   IGP,rfx Aptima HPV all pth    Collection Time: 02/14/24  3:53 PM   Result Value Ref Range    DIAGNOSIS: Comment     Specimen adequacy: Comment     Clinician Provided ICD10 Comment     Performed by: Comment     . .     Note: Comment     Test Methodology: Comment     . Comment    Lipid Panel    Collection Time: 04/09/24 10:10 AM   Result Value Ref Range    Cholesterol 256 (H) 100 - 199 mg/dL    Triglycerides 169 (H) 0 - 149 mg/dL    HDL 64 >39 mg/dL    VLDL Cholesterol Jewel 30 5 - 40 mg/dL    LDL Calculated 162 (H) 0 - 99 mg/dL   Comprehensive Metabolic Panel    Collection Time: 04/09/24  10:10 AM   Result Value Ref Range    Glucose 86 70 - 99 mg/dL    BUN 21 8 - 27 mg/dL    Creatinine 1.08 (H) 0.57 - 1.00 mg/dL    eGFR 57 (L) >59 mL/min/1.73    BUN/Creatinine Ratio 19 12 - 28    Sodium 142 134 - 144 mmol/L    Potassium 4.9 3.5 - 5.2 mmol/L    Chloride 103 96 - 106 mmol/L    CO2 21 20 - 29 mmol/L    Calcium 9.6 8.7 - 10.3 mg/dL    Protein, Total 6.8 6.0 - 8.5 g/dL    Albumin 4.1 3.9 - 4.9 g/dL    Globulin, Total 2.7 1.5 - 4.5 g/dL    Albumin/Globulin Ratio 1.5 1.2 - 2.2    Total Bilirubin <0.2 0.0 - 1.2 mg/dL    Alkaline Phosphatase 81 44 - 121 IU/L    AST 19 0 - 40 IU/L    ALT 15 0 - 32 IU/L   CBC Auto Differential    Collection Time: 04/09/24 10:10 AM   Result Value Ref Range    WBC 7.5 3.4 - 10.8 x10E3/uL    RBC 4.54 3.77 - 5.28 x10E6/uL    Hemoglobin 12.6 11.1 - 15.9 g/dL    Hematocrit 40.1 34.0 - 46.6 %    MCV 88 79 - 97 fL    MCH 27.8 26.6 - 33.0 pg    MCHC 31.4 (L) 31.5 - 35.7 g/dL    RDW 12.6 11.7 - 15.4 %    Platelets 342 150 - 450 x10E3/uL    Neutrophils 52 Not Estab. %    Lymphs 36 Not Estab. %    Monocytes 9 Not Estab. %    Eos 2 Not Estab. %    Basos 1 Not Estab. %    Neutrophils (Absolute) 3.9 1.4 - 7.0 x10E3/uL    Lymphs (Absolute) 2.7 0.7 - 3.1 x10E3/uL    Monocytes(Absolute) 0.6 0.1 - 0.9 x10E3/uL    Eos (Absolute) 0.2 0.0 - 0.4 x10E3/uL    Baso (Absolute) 0.1 0.0 - 0.2 x10E3/uL    Immature Granulocytes 0 Not Estab. %    Immature Grans (Abs) 0.0 0.0 - 0.1 x10E3/uL   Hemoglobin A1C    Collection Time: 04/09/24 10:10 AM   Result Value Ref Range    Hemoglobin A1c 5.6 4.8 - 5.6 %   THYROID PEROXIDASE ANTIBODY    Collection Time: 04/09/24 10:10 AM   Result Value Ref Range    Thyroid Peroxidase Ab 31 0 - 34 IU/mL   T3, Free    Collection Time: 04/09/24 10:10 AM   Result Value Ref Range    T3, Free 2.1 2.0 - 4.4 pg/mL   T4, Free    Collection Time: 04/09/24 10:10 AM   Result Value Ref Range    T4, Free 0.63 (L) 0.82 - 1.77 ng/dL   TSH    Collection Time: 04/09/24 10:10 AM   Result Value Ref  Range    TSH 0.601 0.450 - 4.500 uIU/mL   Vitamin D 25-Hydroxy    Collection Time: 04/09/24 10:10 AM   Result Value Ref Range    Vit D, 25-Hydroxy 69.3 30.0 - 100.0 ng/mL         Assessment    1. Encounter to establish care    2. Osteoarthritis, unspecified osteoarthritis type, unspecified site    3. Prediabetes    4. Postablative hypothyroidism    5. Fatigue, unspecified type    6. Mixed hyperlipidemia    7. Anxiety    8. Primary hypertension    9. Rash          Plan    Amy was seen today for establish care.    Diagnoses and all orders for this visit:    Encounter to establish care    Osteoarthritis, unspecified osteoarthritis type, unspecified site  -     meloxicam (MOBIC) 15 MG tablet; Take 1 tablet (15 mg total) by mouth once daily.    Prediabetes  -     phentermine (ADIPEX-P) 37.5 mg tablet; Take 1 tablet (37.5 mg total) by mouth before breakfast.  -     metFORMIN (GLUCOPHAGE) 500 MG tablet; Take 2 tablets (1,000 mg total) by mouth 2 (two) times daily.    Postablative hypothyroidism  -     liothyronine (CYTOMEL) 25 MCG Tab; TAKE HALF (1/2) TABLET BY MOUTH TWICE DAILY as DIRECTED  -     SYNTHROID 75 mcg tablet; Take 1 tablet (75 mcg total) by mouth before breakfast.    Fatigue, unspecified type  -     cyanocobalamin 1,000 mcg/mL injection; Inject 1 mL (1,000 mcg total) into the skin once a week.    Mixed hyperlipidemia    Anxiety  -     ALPRAZolam (XANAX XR) 0.5 MG Tb24; Take 1 or 1.5 tablets by mouth daily as needed for anxiety.    Primary hypertension  -     triamterene-hydrochlorothiazide 37.5-25 mg (DYAZIDE) 37.5-25 mg per capsule; Take 1 capsule by mouth every morning.    Rash  -     triamcinolone acetonide 0.1% (KENALOG) 0.1 % Lotn; Apply topically 2 (two) times daily.      Physically, everything looks pretty good.      Refills of her medicines have been sent to the pharmacy.      I am okay with continuing everything but the thyroid/Endocrine type medications.  We will defer that to Dr. Villanueva.    Recent  blood work all looks fine.  We can repeat in 6-12 months.    Up-to-date on screening items.    FOLLOW-UP:  Follow up in about 3 months (around 10/9/2024) for med refill.    I spent a total of 45 minutes face to face and non-face to face on the date of this visit.This includes time preparing to see the patient (eg, review of tests, notes), obtaining and/or reviewing additional history from an independent historian and/or outside medical records, documenting clinical information in the electronic health record, independently interpreting results and/or communicating results to the patient/family/caregiver, or care coordinator.  Visit today included increased complexity associated with the care of the episodic problem addressed and managing the longitudinal care of the patient due to the serious and/or complex managed problem(s).    Signed by:  Mauro Medina MD

## 2024-07-09 NOTE — PATIENT INSTRUCTIONS
Overall, everything looks really good today.      I am okay with refilling all of your medications today.  Most of them I will continue prescribing moving forward.      The thyroid medicines, I am going to defer to the endocrinologist.  This way we do not step on each other's toes.  I will send a refill today so you do not run out, but I am going to let Dr. Villanueva do further refills of the medicine.      I worry about your cholesterol.  It is much higher than we would like.  Between now and our next couple visits, look up some info on Repatha.  This is an injection you do every other week.  It really drives down cholesterol levels, lowers risk for heart disease, and has very few side effects.  At some point, we may want to consider starting on it.      Continue to eat a healthy diet.  Be careful with portion sizes.  Includes lots of fresh fruits, vegetables, whole grains, lean proteins.  See info below.    Keep hydrated.  Be sure to drink at least 8-10, 8 oz, glasses of water every day.    Stay active.  Try to do some sort of physical activity every day.  Nothing outrageous, just try walking for 10-15 minutes each day.

## 2024-07-19 ENCOUNTER — TELEPHONE (OUTPATIENT)
Dept: PRIMARY CARE CLINIC | Facility: CLINIC | Age: 69
End: 2024-07-19
Payer: COMMERCIAL

## 2024-07-19 NOTE — TELEPHONE ENCOUNTER
----- Message from Cata Mejia sent at 7/19/2024  3:51 PM CDT -----  Contact: Starr[facility]  Type:  Pharmacy Calling to Clarify an RX    Name of Caller:Starr  Pharmacy Name:[facility]  Prescription Name:ALPRAZolam (XANAX XR) 0.5 MG Tb24  What do they need to clarify?:need another script sent with directions to take 1 tab, because the xr release pills cannot be split  Best Call Back Number:[phone]  Additional Information:

## 2024-08-16 ENCOUNTER — PATIENT MESSAGE (OUTPATIENT)
Dept: INTERNAL MEDICINE | Facility: CLINIC | Age: 69
End: 2024-08-16
Payer: COMMERCIAL

## 2024-08-16 ENCOUNTER — TELEPHONE (OUTPATIENT)
Dept: PRIMARY CARE CLINIC | Facility: CLINIC | Age: 69
End: 2024-08-16
Payer: COMMERCIAL

## 2024-08-16 DIAGNOSIS — M85.80 OSTEOPENIA, UNSPECIFIED LOCATION: ICD-10-CM

## 2024-08-16 RX ORDER — ALENDRONATE SODIUM 70 MG/1
70 TABLET ORAL
Qty: 4 TABLET | Refills: 11 | Status: SHIPPED | OUTPATIENT
Start: 2024-08-16 | End: 2025-08-16

## 2024-08-16 NOTE — TELEPHONE ENCOUNTER
Care Due:                  Date            Visit Type   Department     Provider  --------------------------------------------------------------------------------                                NP -                              PRIMARY      [facility]IMARY  Last Visit: 07-      CARE ([facility])   JUANA Medina                              ESTABLISHED                              PATIENT -    [facility] PRIMARY  Next Visit: 10-      [facility]           Mauro Medina                                                            Last  Test          Frequency    Reason                     Performed    Due Date  --------------------------------------------------------------------------------    HBA1C.......  6 months...  metFORMIN................  04-   10-    Mg Level....  12 months..  alendronate..............  Not Found    Overdue    Phosphate...  12 months..  alendronate..............  Not Found    Overdue    [employer] Embedded Care Due Messages. Reference number: [id number].   8/16/2024 1:38:48 PM CDT

## 2024-08-19 ENCOUNTER — PATIENT MESSAGE (OUTPATIENT)
Dept: PRIMARY CARE CLINIC | Facility: CLINIC | Age: 69
End: 2024-08-19
Payer: COMMERCIAL

## 2024-08-22 ENCOUNTER — TELEPHONE (OUTPATIENT)
Dept: PRIMARY CARE CLINIC | Facility: CLINIC | Age: 69
End: 2024-08-22
Payer: COMMERCIAL

## 2024-08-22 DIAGNOSIS — F41.9 ANXIETY: ICD-10-CM

## 2024-08-22 RX ORDER — ALPRAZOLAM 0.5 MG/1
1 TABLET, EXTENDED RELEASE ORAL DAILY PRN
Qty: 60 TABLET | Refills: 5 | Status: SHIPPED | OUTPATIENT
Start: 2024-08-22

## 2024-08-22 NOTE — TELEPHONE ENCOUNTER
----- Message from Marvin Loyd sent at 8/22/2024  2:01 PM CDT -----  Contact: Jorge Harris  ..Type:  Patient Returning Call    Who Called:Anderson   Who Left Message for Patient:  Does the patient know what this is regarding?: prescription   Would the patient rather a call back or a response via [facility]? Call back   Best Call Back Number: [phone]  Additional Information: Anderson states she missed an call

## 2024-08-22 NOTE — TELEPHONE ENCOUNTER
Spoke with Anderson from [facility] pharmacy and she advised me that pt will need another rx because the Alprazolam XR 0.5 can't be cut in half

## 2024-08-22 NOTE — TELEPHONE ENCOUNTER
Spoke with pharmacy and they advised me that they can fill pts RX (alprazolam 0.5 1 or 1.5 tab) . I called and advised pt and pt verbalized understanding

## 2024-10-09 ENCOUNTER — OFFICE VISIT (OUTPATIENT)
Dept: PRIMARY CARE CLINIC | Facility: CLINIC | Age: 69
End: 2024-10-09
Payer: MEDICARE

## 2024-10-09 DIAGNOSIS — M54.42 CHRONIC BILATERAL LOW BACK PAIN WITH BILATERAL SCIATICA: ICD-10-CM

## 2024-10-09 DIAGNOSIS — M54.41 CHRONIC BILATERAL LOW BACK PAIN WITH BILATERAL SCIATICA: ICD-10-CM

## 2024-10-09 DIAGNOSIS — M19.90 OSTEOARTHRITIS, UNSPECIFIED OSTEOARTHRITIS TYPE, UNSPECIFIED SITE: Primary | ICD-10-CM

## 2024-10-09 DIAGNOSIS — R73.03 PREDIABETES: ICD-10-CM

## 2024-10-09 DIAGNOSIS — G89.29 CHRONIC BILATERAL LOW BACK PAIN WITH BILATERAL SCIATICA: ICD-10-CM

## 2024-10-09 DIAGNOSIS — Z82.0 FAMILY HISTORY OF ALZHEIMER DISEASE: ICD-10-CM

## 2024-10-09 DIAGNOSIS — F41.9 ANXIETY: ICD-10-CM

## 2024-10-09 RX ORDER — PHENTERMINE HYDROCHLORIDE 37.5 MG/1
37.5 TABLET ORAL
Qty: 30 TABLET | Refills: 2 | Status: SHIPPED | OUTPATIENT
Start: 2024-10-09

## 2024-10-09 RX ORDER — METFORMIN HYDROCHLORIDE 500 MG/1
1000 TABLET ORAL 2 TIMES DAILY
Qty: 360 TABLET | Refills: 1 | Status: SHIPPED | OUTPATIENT
Start: 2024-10-09

## 2024-10-09 RX ORDER — MELOXICAM 15 MG/1
15 TABLET ORAL DAILY
Qty: 90 TABLET | Refills: 1 | Status: SHIPPED | OUTPATIENT
Start: 2024-10-09

## 2024-10-09 NOTE — PROGRESS NOTES
"    [facility]       [address] [place] [address]      Phone: [phone]      Fax: [phone]    Mauro Medina MD                Video Visit  10/09/2024        Subjective      HPI:  Amy Charles is a 68 y.o. female presents today via video for "No chief complaint on file.  ."     68-year-old female presents today via video via video visit to discuss a couple of issues.    Overall, doing okay today.  Still trying to get her neck/back procedures completed.  Was able to get an injection in her thoracic spine.  Waiting on her neck.  Has been having difficulty scheduling due to the provider's offices.    She says she spoke with her pain management specialist.  He is okay with her using CBD/THC.  Interested in pursuing this.    She is interested in a genetic test for dementia/Alzheimer's.  States her sister recently passed after being diagnosed with early-onset Alzheimer's.  Says her insurance company may pay for it, she just needs the code for the test.    Has extensive Endocrine and thyroid issues, including Hashimoto's, Graves, hyperthyroidism, adrenal insufficiency, fatigue, estrogen issues, other autoimmune disorders.  When Dr. Garcia left the practice, she did refer her to Dr. Villanueva at [facility].  She had one visit over there, but only saw Dr. Villanueva's PA.  Supposed to have another visit soon with Dr. Villanueva herself (12/13/24).  Currently takes brand-name Synthroid 75 mcg daily, liothyronine 25 mg (1/2 tablet, twice a day).    Has prediabetes.  Takes metformin for this.  Helps keep sugar under control.  Also helps with constipation issues.  Additionally, watches diet by using phentermine daily.  No issues with this medication.      She also has ADHD.  Prior to the phentermine, she was taking Adderall 10 mg.  When she switched to the phentermine, she found that controlled appetite and worked better for her ADHD symptoms.  She also has other back " issues (see below), so when they go to do a procedure, they want her off the phentermine for 5-7 days.  During this time, she will switch to Adderall to help with focus.  Then, after the procedure, she goes back to the phentermine.    Ongoing issues with anxiety.  Takes Xanax 0.5 mg, one or 1.5 tablets per day.  Generally keeps her anxiety under control.      Has issues with arthritis, chronic back pains, lumbar pains.  Follows with back specialists, pain management.  Pain management has her on Norco 10.  She takes 1/2 tablet twice a day.  Supplements with meloxicam.  Has had multiple procedures done on her back.    Has HLD.  Not currently on any medications.    Has HTN.  Uses triamterene-HCTZ daily.  Blood pressure generally under good control.    She also mentions that, in the past, she has tested positive for antibodies to HCV, HBV.  Viral load has always been negative.    PMH:  HTN.  HLD.  Prediabetes.  Thyroid issues.  Anxiety.  Chronic back pains.  ADHD.  PSH:  Multiple back procedures.  Breast implant/removal.  Colectomy.  Tonsils.  F MH: CAD.  Dementia.  Breast cancer.    Allergies: Multiple, see epic.    Social:  Retired.  Previously on disability.    T: One ppd times 50+ years.    A: Socially   D: Denies     Exercise: Stays active, when she can.      GI: Dr. Sylvester  Pain: Dr. Marcelino  Back: Dr. Medrano/[facility]  Endo: Dr. Villanueva at [place]    MM/15/2024  Colon:  2024.  Dr. Sylvester.  Repeat five years ().            The following were updated and reviewed by myself in the chart: medications, past medical history, past surgical history, family history, social history, and allergies.     Medications:  Current Outpatient Medications on File Prior to Visit   Medication Sig Dispense Refill    alendronate (FOSAMAX) 70 MG tablet Take 1 tablet (70 mg total) by mouth every 7 days. 4 tablet 11    ALPRAZolam (XANAX XR) 0.5 MG Tb24 Take 2 tablets (1 mg total) by mouth daily as needed (anxiety). 60 tablet  5    cyanocobalamin 1,000 mcg/mL injection Inject 1 mL (1,000 mcg total) into the skin once a week. 4 mL 5    dextroamphetamine-amphetamine 10 mg Tab Take 1 tablet by mouth 2 (two) times daily.      HYDROcodone-acetaminophen (NORCO)  mg per tablet Take 0.5 tablets by mouth 2 (two) times a day.      liothyronine (CYTOMEL) 25 MCG Tab TAKE HALF (1/2) TABLET BY MOUTH TWICE DAILY as DIRECTED 30 tablet 5    SYNTHROID 75 mcg tablet Take 1 tablet (75 mcg total) by mouth before breakfast. 30 tablet 11    triamcinolone acetonide 0.1% (KENALOG) 0.1 % Lotn Apply topically 2 (two) times daily. 60 mL 11    triamterene-hydrochlorothiazide 37.5-25 mg (DYAZIDE) 37.5-25 mg per capsule Take 1 capsule by mouth every morning. 30 capsule 11    [DISCONTINUED] meloxicam (MOBIC) 15 MG tablet Take 1 tablet (15 mg total) by mouth once daily. 90 tablet 1    [DISCONTINUED] metFORMIN (GLUCOPHAGE) 500 MG tablet Take 2 tablets (1,000 mg total) by mouth 2 (two) times daily. 360 tablet 1    [DISCONTINUED] phentermine (ADIPEX-P) 37.5 mg tablet Take 1 tablet (37.5 mg total) by mouth before breakfast. 30 tablet 2     No current facility-administered medications on file prior to visit.        PMHx:  Past Medical History:   Diagnosis Date    Anxiety     BRCA gene mutation negative     BRCA1 negative     BRCA2 negative     Chronic bronchitis     DDD (degenerative disc disease)     Fatigue     Fibromyalgia     Graves' disease with exophthalmos     Hashimoto's disease     Hepatitis B carrier     Hyperthyroidism     Prediabetes 05/18/2023    Scotoma involving central area     Tinnitus of both ears       Patient Active Problem List    Diagnosis Date Noted    Osteoarthritis of spine with radiculopathy, lumbar region 04/12/2024    Osteopenia 04/12/2024    Personal history of nicotine dependence 04/12/2024    Chronic obstructive pulmonary disease, unspecified COPD type 01/18/2024    Chronic kidney disease, stage 3a 09/18/2023    Postablative hypothyroidism  09/18/2023    Anxiety 05/18/2023    Hashimoto's disease 05/18/2023    Vitamin D deficiency 05/18/2023    Prediabetes 05/18/2023    Mixed hyperlipidemia 05/18/2023    Graves' orbitopathy 07/26/2016    Current smoker 10/27/2014        PSHx:  Past Surgical History:   Procedure Laterality Date    AUGMENTATION OF BREAST Bilateral 1979    Replaced 1998- Removed 2014    CATARACT EXTRACTION Bilateral 03/2015    ENDOMETRIAL ABLATION      HYSTEROSCOPY      LOOP ELECTROSURGICAL EXCISION PROCEDURE (LEEP)      THYROIDECTOMY      TONSILLECTOMY Bilateral 1965    TUBAL LIGATION Bilateral 1990        FHx:  Family History   Problem Relation Name Age of Onset    Graves' disease Mother      Breast cancer Mother  60    Cataracts Mother      Osteoporosis Mother      Thyroid disease Father      Heart disease Father      Cataracts Father      Hypertension Father      Pancreatic cancer Maternal Aunt      Ovarian cancer Paternal Grandmother  35    Cirrhosis Paternal Grandfather      Breast cancer Cousin  35    Breast cancer Sister          Social:  Social History     Socioeconomic History    Marital status:    Tobacco Use    Smoking status: Every Day     Current packs/day: 1.00     Average packs/day: 1 pack/day for 32.8 years (32.8 ttl pk-yrs)     Types: Cigarettes     Start date: 1/1/1992    Smokeless tobacco: Never   Substance and Sexual Activity    Alcohol use: Yes     Comment: Drinks on special occasions, approximately 4-5 times a year.    Drug use: No    Sexual activity: Not Currently     Partners: Male     Birth control/protection: Post-menopausal     Comment: TL        Allergies:  Review of patient's allergies indicates:   Allergen Reactions    Cefuroxime axetil      Other reaction(s): RASH AND ITCHING    Codeine Other (See Comments)     Other reaction(s): Itching    Penicillin Other (See Comments)    Penicillins      Other reaction(s): Unknown    Sulfa (sulfonamide antibiotics) Other (See Comments)     Other reaction(s):  "Unknown    Sulfamethoxazole-trimethoprim Other (See Comments)    Tuna oil     Wasp sting [allergen ext-venom-honey bee]     Wasp venom Other (See Comments)        ROS:  Review of Systems   Constitutional:  Negative for activity change and unexpected weight change.   HENT:  Positive for hearing loss. Negative for rhinorrhea and trouble swallowing.    Eyes:  Positive for discharge. Negative for visual disturbance.   Respiratory:  Negative for chest tightness and wheezing.    Cardiovascular:  Negative for chest pain and palpitations.   Gastrointestinal:  Negative for blood in stool, constipation, diarrhea and vomiting.   Endocrine: Negative for polydipsia and polyuria.   Genitourinary:  Negative for difficulty urinating, dysuria, hematuria and menstrual problem.   Musculoskeletal:  Positive for arthralgias and neck pain.   Neurological:  Negative for weakness and headaches.   Psychiatric/Behavioral:  Negative for confusion and dysphoric mood.           Objective      There were no vitals taken for this visit.  Ht Readings from Last 3 Encounters:   07/09/24 4' 11" (1.499 m)   04/12/24 4' 11" (1.499 m)   02/14/24 4' 11" (1.499 m)     Wt Readings from Last 3 Encounters:   07/09/24 50.4 kg (111 lb 1.8 oz)   04/12/24 53.3 kg (117 lb 8.1 oz)   02/14/24 54 kg (119 lb)       PHYSICAL EXAM:  Physical Exam  Constitutional:       General: She is not in acute distress.     Appearance: Normal appearance. She is not ill-appearing.   HENT:      Head: Normocephalic and atraumatic.   Pulmonary:      Effort: Pulmonary effort is normal. No respiratory distress.   Neurological:      Mental Status: She is alert.   Psychiatric:         Mood and Affect: Mood normal.         Behavior: Behavior normal.         Thought Content: Thought content normal.              LABS / IMAGING:  No results found for this or any previous visit (from the past 26 weeks).      Assessment    1. Osteoarthritis, unspecified osteoarthritis type, unspecified site  "   2. Chronic bilateral low back pain with bilateral sciatica    3. Prediabetes    4. Anxiety    5. Family history of Alzheimer disease          Plan    Diagnoses and all orders for this visit:    Osteoarthritis, unspecified osteoarthritis type, unspecified site  -     Ambulatory referral/consult to Neurology; Future  -     meloxicam (MOBIC) 15 MG tablet; Take 1 tablet (15 mg total) by mouth once daily.    Chronic bilateral low back pain with bilateral sciatica  -     Ambulatory referral/consult to Neurology; Future    Prediabetes  -     metFORMIN (GLUCOPHAGE) 500 MG tablet; Take 2 tablets (1,000 mg total) by mouth 2 (two) times daily.  -     phentermine (ADIPEX-P) 37.5 mg tablet; Take 1 tablet (37.5 mg total) by mouth before breakfast.    Anxiety    Family history of Alzheimer disease  -     E-Consult to Genetics    On screen, everything looks okay.      Med refills, as above.      We will send a eConsult to genetics to see if there is a genetic test she can do to detect early onset Alzheimer's or other dementia.  We will ask them for the CPT code.    Additionally, okay to consider medical marijuana.  Referral to Dr. ALECIA zaidi.  Since he was a neurologist in has experience with dementia, recommended she ask him about the genetic test, as well.      FOLLOW-UP:  Follow up in about 3 months (around 1/9/2025) for med refill.    The patient location is:  [place]  The chief complaint leading to consultation is:  Back pains, prediabetes, med refills, Alzheimer's, medical marijuana    Visit type: audiovisual    Face to Face time with patient: 20 minutes    30 minutes of total time spent on the encounter, which includes face to face time and non-face to face time preparing to see the patient (eg, review of tests), Obtaining and/or reviewing separately obtained history, Documenting clinical information in the electronic or other health record, Independently interpreting results (not separately reported) and  communicating results to the patient/family/caregiver, or Care coordination (not separately reported). Visit today included increased complexity associated with the care of the episodic problem addressed and managing the longitudinal care of the patient due to the serious and/or complex managed problem(s).    Each patient to whom he or she provides medical services by telemedicine is:  (1) informed of the relationship between the physician and patient and the respective role of any other health care provider with respect to management of the patient; and (2) notified that he or she may decline to receive medical services by telemedicine and may withdraw from such care at any time.    Signed by:  Mauro Medina MD

## 2024-10-09 NOTE — PATIENT INSTRUCTIONS
On screen, everything looks pretty good.      If you would like to discuss using medical marijuana, please feel free to follow-up with Dr. Pb Wilson, neurology, with [facility] in [place].  He can provide the recommendation needed.  Referral placed today.  Please call him at [phone] to schedule an appointment.  His office is located at [address] [place], [place] [address]  in [place].      [email or web address]    When you see him, you can also ask him about the genetic test for early-onset Alzheimer's/dementia.    Separately, I will send an eConsult to our genetics team to see if they know anything about such a test.  I will ask them for the CPT code as well.  I will let you know what they say.      Keep your appointment in December with the endocrinologist.      In the meantime, refills of phentermine metformin, and meloxicam has been sent to the pharmacy.  You should have refills of your Xanax, and other medications already at the pharmacy.      Continue to eat a healthy diet.  Be careful with portion sizes.  Includes lots of fresh fruits, vegetables, whole grains, lean proteins.  See info below.    Keep hydrated.  Be sure to drink at least 8-10, 8 oz, glasses of water every day.    Stay active.  Try to do some sort of physical activity every day.  Nothing outrageous, just try walking for 10-15 minutes each day.

## 2024-10-10 ENCOUNTER — E-CONSULT (OUTPATIENT)
Dept: GENETICS | Facility: CLINIC | Age: 69
End: 2024-10-10
Payer: COMMERCIAL

## 2024-10-10 DIAGNOSIS — Z82.0 FAMILY HISTORY OF ALZHEIMER DISEASE: Primary | ICD-10-CM

## 2024-10-10 NOTE — CONSULTS
[facility]  Response for E-Consult     Patient Name: Amy Charles  MRN: [id number]  Primary Care Provider: Mauro Medina MD   Requesting Provider: Mauro Medina MD  E-Consult to Genetics  Consult performed by: Reema Shaw MD  Consult ordered by: Mauro Medina MD  Reason for consult: family hisotry of early-onset Alzheimers          After evaluation of your eConsult clinical questions, I believe the patient should be scheduled for an office visit in our specialty due to the psychological implications and the need for pre-test genetic counseling (there is a possibility of unclear results or negative results which may not exclude her risk), it is recommended that this patient be seen in Genetics clinic for counseling and testing if appropriate.    Total time of Consultation: 5 minute    *This eConsult is based on the clinical data available to me and is furnished without benefit of a physician examination.  The eConsult will need to be interpreted in light of any clinical issues of changes in patient status not available to me at the time rime of filing this eConsults.  Significant changes in patient condition of level of acuity should result in a referral for in person consultation and reevaluation.  Please alert me if you have any furth questions.     Thank you for this eConsult referral.     [facility]

## 2024-10-14 ENCOUNTER — PATIENT MESSAGE (OUTPATIENT)
Dept: PRIMARY CARE CLINIC | Facility: CLINIC | Age: 69
End: 2024-10-14
Payer: COMMERCIAL

## 2024-12-04 DIAGNOSIS — N18.31 CHRONIC KIDNEY DISEASE, STAGE 3A: ICD-10-CM

## 2025-01-13 ENCOUNTER — TELEPHONE (OUTPATIENT)
Dept: PRIMARY CARE CLINIC | Facility: CLINIC | Age: 70
End: 2025-01-13
Payer: COMMERCIAL

## 2025-01-13 NOTE — TELEPHONE ENCOUNTER
----- Message from Ayana sent at 1/13/2025  2:42 PM CST -----  Contact: Amy  Type:  Sooner Apoointment Request    Caller is requesting a sooner appointment. Caller will not accept being placed on the waitlist and is requesting a message be sent to doctor.  Name of Caller:Amy  When is the first available appointment?scheduled 2/4/25  Symptoms:Surgical clearance/3 month/medication refill  Would the patient rather a call back or a response via [facility]? call  Best Call Back Number:[phone]   Additional Information: Patient request to be seen sooner for appointment. Please give patient a call back to assist.   Thank you,  GH

## 2025-02-01 ENCOUNTER — OFFICE VISIT (OUTPATIENT)
Dept: PRIMARY CARE CLINIC | Facility: CLINIC | Age: 70
End: 2025-02-01
Payer: COMMERCIAL

## 2025-02-01 VITALS
BODY MASS INDEX: 23.56 KG/M2 | SYSTOLIC BLOOD PRESSURE: 112 MMHG | HEIGHT: 59 IN | HEART RATE: 101 BPM | DIASTOLIC BLOOD PRESSURE: 68 MMHG | WEIGHT: 116.88 LBS

## 2025-02-01 DIAGNOSIS — R53.83 FATIGUE, UNSPECIFIED TYPE: ICD-10-CM

## 2025-02-01 DIAGNOSIS — I10 PRIMARY HYPERTENSION: ICD-10-CM

## 2025-02-01 DIAGNOSIS — M19.90 OSTEOARTHRITIS, UNSPECIFIED OSTEOARTHRITIS TYPE, UNSPECIFIED SITE: ICD-10-CM

## 2025-02-01 DIAGNOSIS — R73.03 PREDIABETES: ICD-10-CM

## 2025-02-01 DIAGNOSIS — Z01.818 PREOP EXAMINATION: Primary | ICD-10-CM

## 2025-02-01 DIAGNOSIS — F41.9 ANXIETY: ICD-10-CM

## 2025-02-01 DIAGNOSIS — Z11.59 ENCOUNTER FOR HCV SCREENING TEST FOR LOW RISK PATIENT: ICD-10-CM

## 2025-02-01 DIAGNOSIS — E89.0 POSTABLATIVE HYPOTHYROIDISM: ICD-10-CM

## 2025-02-01 DIAGNOSIS — I70.0 AORTIC ATHEROSCLEROSIS: ICD-10-CM

## 2025-02-01 PROBLEM — N18.31 CHRONIC KIDNEY DISEASE, STAGE 3A: Status: RESOLVED | Noted: 2023-09-18 | Resolved: 2025-02-01

## 2025-02-01 PROBLEM — J44.9 CHRONIC OBSTRUCTIVE PULMONARY DISEASE, UNSPECIFIED COPD TYPE: Status: RESOLVED | Noted: 2024-01-18 | Resolved: 2025-02-01

## 2025-02-01 PROCEDURE — 99214 OFFICE O/P EST MOD 30 MIN: CPT | Mod: PBBFAC,PN | Performed by: FAMILY MEDICINE

## 2025-02-01 PROCEDURE — G2211 COMPLEX E/M VISIT ADD ON: HCPCS | Mod: S$GLB,,, | Performed by: FAMILY MEDICINE

## 2025-02-01 PROCEDURE — 99215 OFFICE O/P EST HI 40 MIN: CPT | Mod: S$GLB,,, | Performed by: FAMILY MEDICINE

## 2025-02-01 PROCEDURE — 99999 PR PBB SHADOW E&M-EST. PATIENT-LVL IV: CPT | Mod: PBBFAC,,, | Performed by: FAMILY MEDICINE

## 2025-02-01 RX ORDER — MELOXICAM 15 MG/1
15 TABLET ORAL DAILY
Qty: 90 TABLET | Refills: 1 | Status: SHIPPED | OUTPATIENT
Start: 2025-02-01

## 2025-02-01 RX ORDER — CYANOCOBALAMIN 1000 UG/ML
1000 INJECTION, SOLUTION INTRAMUSCULAR; SUBCUTANEOUS WEEKLY
Qty: 4 ML | Refills: 5 | Status: SHIPPED | OUTPATIENT
Start: 2025-02-01

## 2025-02-01 RX ORDER — ALPRAZOLAM 0.5 MG/1
1 TABLET, EXTENDED RELEASE ORAL DAILY PRN
Qty: 60 TABLET | Refills: 5 | Status: SHIPPED | OUTPATIENT
Start: 2025-02-01

## 2025-02-01 RX ORDER — TRIAMTERENE AND HYDROCHLOROTHIAZIDE 37.5; 25 MG/1; MG/1
1 CAPSULE ORAL EVERY MORNING
Qty: 30 CAPSULE | Refills: 11 | Status: SHIPPED | OUTPATIENT
Start: 2025-02-01

## 2025-02-01 RX ORDER — METFORMIN HYDROCHLORIDE 500 MG/1
1000 TABLET ORAL 2 TIMES DAILY
Qty: 360 TABLET | Refills: 1 | Status: SHIPPED | OUTPATIENT
Start: 2025-02-01

## 2025-02-01 RX ORDER — PHENTERMINE HYDROCHLORIDE 37.5 MG/1
37.5 TABLET ORAL
Qty: 30 TABLET | Refills: 5 | Status: SHIPPED | OUTPATIENT
Start: 2025-02-01

## 2025-02-01 NOTE — PROGRESS NOTES
"    [facility]       [address] [place] [address]      Phone: [phone]      Fax: [phone]    Mauro Medina MD                Office Visit  02/01/2025        Subjective      HPI:  Amy Charles is a 69 y.o. female presents today in clinic for "Pre-op Exam  ."     69-year-old female presents today for preop evaluation, checkup on other issues.      Scheduled to have surgery on Tuesday, February 4.  Getting levator recession and blepharoplasty.  Likely local/mac sedation at the surgery center.    Feels fine today.  No acute complaints.  No chest pain, shortness on breath.  No fever, chills, body aches.  No coughing, sneezing, URI type symptoms.  Appetite normal.  Bowel movements normal.  No urinary issues.    Has extensive Endocrine and thyroid issues, including Hashimoto's, Graves, hyperthyroidism, adrenal insufficiency, fatigue, estrogen issues, other autoimmune disorders.  Saw her endocrinologist, Dr. Villanueva.  Everything going okay with her hormones.  Currently takes brand-name Synthroid 75 mcg daily, liothyronine 25 mg (1/2 tablet, twice a day).    Has prediabetes.  Takes metformin for this.  Helps keep sugar under control.  Also helps with constipation issues.  Additionally, watches diet by using phentermine daily.  No issues with this medication.      She also has ADHD.  Prior to the phentermine, she was taking Adderall 10 mg.  When she switched to the phentermine, she found that controlled appetite and worked better for her ADHD symptoms.  She also has other back issues (see below), so when they go to do a procedure, they want her off the phentermine for 5-7 days.  During this time, she will switch to Adderall to help with focus.  Then, after the procedure, she goes back to the phentermine.    Ongoing issues with anxiety.  Takes Xanax 0.5 mg, one or 1.5 tablets per day.  Generally keeps her anxiety under control.      Has issues with arthritis, " chronic back pains, lumbar pains.  Follows with back specialists, pain management.  Pain management has her on Norco 10.  She takes 1/2 tablet twice a day.  Supplements with meloxicam.  Has had multiple procedures done on her back.    Has HLD.  Not currently on any medications.    Has HTN.  Uses triamterene-HCTZ daily.  Blood pressure generally under good control.    She also mentions that, in the past, she has tested positive for antibodies to HCV, HBV.  Viral load has always been negative.    PMH:  HTN.  HLD.  Prediabetes.  Thyroid issues.  Anxiety.  Chronic back pains.  ADHD.  PSH:  Multiple back procedures.  Breast implant/removal.  Colectomy.  Tonsils.  F MH: CAD.  Dementia.  Breast cancer.    Allergies: Multiple, see epic.    Social:  Retired.  Previously on disability.    T: One ppd times 50+ years.    A: Socially   D: Denies     Exercise: Stays active, when she can.      GI: Dr. Sylvester  Pain: Dr. Marcelino  Back: Dr. Medrano/[facility]  Endo: Dr. Villanueva at [facility]    MM/15/2024  Colon:  2024.  Dr. Sylvester.  Repeat five years ().            The following were updated and reviewed by myself in the chart: medications, past medical history, past surgical history, family history, social history, and allergies.     Medications:  Current Outpatient Medications on File Prior to Visit   Medication Sig Dispense Refill    alendronate (FOSAMAX) 70 MG tablet Take 1 tablet (70 mg total) by mouth every 7 days. 4 tablet 11    HYDROcodone-acetaminophen (NORCO)  mg per tablet Take 0.5 tablets by mouth 2 (two) times a day.      liothyronine (CYTOMEL) 25 MCG Tab TAKE HALF (1/2) TABLET BY MOUTH TWICE DAILY as DIRECTED 30 tablet 5    SYNTHROID 75 mcg tablet Take 1 tablet (75 mcg total) by mouth before breakfast. 30 tablet 11    triamcinolone acetonide 0.1% (KENALOG) 0.1 % Lotn Apply topically 2 (two) times daily. 60 mL 11    [DISCONTINUED] ALPRAZolam (XANAX XR) 0.5 MG Tb24 Take 2 tablets (1 mg total) by mouth  daily as needed (anxiety). 60 tablet 5    [DISCONTINUED] cyanocobalamin 1,000 mcg/mL injection Inject 1 mL (1,000 mcg total) into the skin once a week. 4 mL 5    [DISCONTINUED] dextroamphetamine-amphetamine 10 mg Tab Take 1 tablet by mouth 2 (two) times daily.      [DISCONTINUED] meloxicam (MOBIC) 15 MG tablet Take 1 tablet (15 mg total) by mouth once daily. 90 tablet 1    [DISCONTINUED] metFORMIN (GLUCOPHAGE) 500 MG tablet Take 2 tablets (1,000 mg total) by mouth 2 (two) times daily. 360 tablet 1    [DISCONTINUED] phentermine (ADIPEX-P) 37.5 mg tablet Take 1 tablet (37.5 mg total) by mouth before breakfast. 30 tablet 2    [DISCONTINUED] triamterene-hydrochlorothiazide 37.5-25 mg (DYAZIDE) 37.5-25 mg per capsule Take 1 capsule by mouth every morning. 30 capsule 11     No current facility-administered medications on file prior to visit.        PMHx:  Past Medical History:   Diagnosis Date    Anxiety     BRCA gene mutation negative     BRCA1 negative     BRCA2 negative     Chronic bronchitis     DDD (degenerative disc disease)     Fatigue     Fibromyalgia     Graves' disease with exophthalmos     Hashimoto's disease     Hepatitis B carrier     Hyperthyroidism     Prediabetes 05/18/2023    Scotoma involving central area     Tinnitus of both ears       Patient Active Problem List    Diagnosis Date Noted    Aortic atherosclerosis 02/01/2025    Family history of Alzheimer disease 10/10/2024    Osteoarthritis of spine with radiculopathy, lumbar region 04/12/2024    Osteopenia 04/12/2024    Personal history of nicotine dependence 04/12/2024    Postablative hypothyroidism 09/18/2023    Anxiety 05/18/2023    Hashimoto's disease 05/18/2023    Vitamin D deficiency 05/18/2023    Prediabetes 05/18/2023    Mixed hyperlipidemia 05/18/2023    Graves' orbitopathy 07/26/2016    Current smoker 10/27/2014        PSHx:  Past Surgical History:   Procedure Laterality Date    AUGMENTATION OF BREAST Bilateral 1979    Replaced 1998- Removed  2014    CATARACT EXTRACTION Bilateral 03/2015    ENDOMETRIAL ABLATION      HYSTEROSCOPY      LOOP ELECTROSURGICAL EXCISION PROCEDURE (LEEP)      THYROIDECTOMY      TONSILLECTOMY Bilateral 1965    TUBAL LIGATION Bilateral 1990        FHx:  Family History   Problem Relation Name Age of Onset    Graves' disease Mother      Breast cancer Mother  60    Cataracts Mother      Osteoporosis Mother      Thyroid disease Father      Heart disease Father      Cataracts Father      Hypertension Father      Pancreatic cancer Maternal Aunt      Ovarian cancer Paternal Grandmother  35    Cirrhosis Paternal Grandfather      Breast cancer Cousin  35    Breast cancer Sister          Social:  Social History     Socioeconomic History    Marital status:    Tobacco Use    Smoking status: Every Day     Current packs/day: 1.00     Average packs/day: 1 pack/day for 33.1 years (33.1 ttl pk-yrs)     Types: Cigarettes     Start date: 1/1/1992    Smokeless tobacco: Never   Substance and Sexual Activity    Alcohol use: Yes     Comment: Drinks on special occasions, approximately 4-5 times a year.    Drug use: No    Sexual activity: Not Currently     Partners: Male     Birth control/protection: Post-menopausal     Comment: TL        Allergies:  Review of patient's allergies indicates:   Allergen Reactions    Cefuroxime axetil      Other reaction(s): RASH AND ITCHING    Codeine Other (See Comments)     Other reaction(s): Itching    Penicillin Other (See Comments)    Penicillins      Other reaction(s): Unknown    Sulfa (sulfonamide antibiotics) Other (See Comments)     Other reaction(s): Unknown    Sulfamethoxazole-trimethoprim Other (See Comments)    Tuna oil     Wasp sting [allergen ext-venom-honey bee]     Wasp venom Other (See Comments)        ROS:  Review of Systems   Constitutional:  Negative for activity change, appetite change, chills and fever.   HENT:  Negative for congestion, postnasal drip, rhinorrhea, sore throat and trouble  "swallowing.    Respiratory:  Negative for cough, shortness of breath and wheezing.    Cardiovascular:  Negative for chest pain and palpitations.   Gastrointestinal:  Negative for abdominal pain, constipation, diarrhea, nausea and vomiting.   Genitourinary:  Negative for difficulty urinating.   Musculoskeletal:  Positive for arthralgias and myalgias.   Skin:  Negative for color change and rash.   Neurological:  Negative for speech difficulty and headaches.   All other systems reviewed and are negative.         Objective      /68   Pulse 101   Ht 4' 11" (1.499 m)   Wt 53 kg (116 lb 13.5 oz)   BMI 23.60 kg/m²   Ht Readings from Last 3 Encounters:   02/01/25 4' 11" (1.499 m)   07/09/24 4' 11" (1.499 m)   04/12/24 4' 11" (1.499 m)     Wt Readings from Last 3 Encounters:   02/01/25 53 kg (116 lb 13.5 oz)   07/09/24 50.4 kg (111 lb 1.8 oz)   04/12/24 53.3 kg (117 lb 8.1 oz)       PHYSICAL EXAM:  Physical Exam  Vitals and nursing note reviewed.   Constitutional:       General: She is not in acute distress.     Appearance: Normal appearance.   HENT:      Head: Normocephalic and atraumatic.      Right Ear: Tympanic membrane, ear canal and external ear normal.      Left Ear: Tympanic membrane, ear canal and external ear normal.      Nose: Nose normal. No congestion or rhinorrhea.      Mouth/Throat:      Mouth: Mucous membranes are moist.      Pharynx: Oropharynx is clear. No oropharyngeal exudate or posterior oropharyngeal erythema.   Eyes:      Extraocular Movements: Extraocular movements intact.      Conjunctiva/sclera: Conjunctivae normal.      Pupils: Pupils are equal, round, and reactive to light.   Cardiovascular:      Rate and Rhythm: Normal rate and regular rhythm.   Pulmonary:      Effort: Pulmonary effort is normal. No respiratory distress.      Breath sounds: No wheezing, rhonchi or rales.   Musculoskeletal:         General: Normal range of motion.      Cervical back: Normal range of motion. "   Lymphadenopathy:      Cervical: No cervical adenopathy.   Skin:     General: Skin is warm and dry.      Findings: No rash.   Neurological:      Mental Status: She is alert.              LABS / IMAGING:  No results found for this or any previous visit (from the past 26 weeks).      Assessment    1. Preop examination    2. Anxiety    3. Fatigue, unspecified type    4. Osteoarthritis, unspecified osteoarthritis type, unspecified site    5. Prediabetes    6. Primary hypertension    7. Postablative hypothyroidism    8. Encounter for HCV screening test for low risk patient    9. Aortic atherosclerosis          Plan    Amy was seen today for pre-op exam.    Diagnoses and all orders for this visit:    Preop examination    Anxiety  -     ALPRAZolam (XANAX XR) 0.5 MG Tb24; Take 2 tablets (1 mg total) by mouth daily as needed (anxiety).    Fatigue, unspecified type  -     cyanocobalamin 1,000 mcg/mL injection; Inject 1 mL (1,000 mcg total) into the skin once a week.    Osteoarthritis, unspecified osteoarthritis type, unspecified site  -     meloxicam (MOBIC) 15 MG tablet; Take 1 tablet (15 mg total) by mouth once daily.    Prediabetes  -     metFORMIN (GLUCOPHAGE) 500 MG tablet; Take 2 tablets (1,000 mg total) by mouth 2 (two) times daily.  -     phentermine (ADIPEX-P) 37.5 mg tablet; Take 1 tablet (37.5 mg total) by mouth before breakfast.  -     Hemoglobin A1C; Future  -     Microalbumin/Creatinine Ratio, Urine; Future  -     Hemoglobin A1C  -     Microalbumin/Creatinine Ratio, Urine    Primary hypertension  -     triamterene-hydrochlorothiazide 37.5-25 mg (DYAZIDE) 37.5-25 mg per capsule; Take 1 capsule by mouth every morning.  -     Lipid Panel; Future  -     TSH; Future  -     Comprehensive Metabolic Panel; Future  -     CBC Auto Differential; Future  -     Microalbumin/Creatinine Ratio, Urine; Future  -     Lipid Panel  -     TSH  -     Comprehensive Metabolic Panel  -     CBC Auto Differential  -      Microalbumin/Creatinine Ratio, Urine    Postablative hypothyroidism  -     TSH; Future  -     TSH    Encounter for HCV screening test for low risk patient  -     Hepatitis C Antibody; Future  -     Hepatitis C Antibody    Aortic atherosclerosis    Physically, everything looks okay.      Preop form filled out.  Faxed copy to eye doctor.  Original given to patient to bring with her the day of surgery.    Med refills, as above.      Orders for screening blood work to be done prior to next visit.  She gets labs at [facility].    Reviewed endocrinology notes.      FOLLOW-UP:  Follow up in about 6 months (around 8/1/2025) for check up, labs 1 week prior.    I spent a total of 40 minutes face to face and non-face to face on the date of this visit.This includes time preparing to see the patient (eg, review of tests, notes), obtaining and/or reviewing additional history from an independent historian and/or outside medical records, documenting clinical information in the electronic health record, independently interpreting results and/or communicating results to the patient/family/caregiver, or care coordinator.  Visit today included increased complexity associated with the care of the episodic problem addressed and managing the longitudinal care of the patient due to the serious and/or complex managed problem(s).    Signed by:  Mauro Medina MD

## 2025-02-01 NOTE — PATIENT INSTRUCTIONS
Overall, everything looks good today.      Med refills sent to the pharmacy.  Please continue taking them, as directed.      Orders for screening blood work placed today.  We can do these at lab [facility].  We would like to do them in about six months, just before our next visit so we can discuss results together.      Preop form filled out.  I faxed a copy to the eye doctor.  You should have the original to take with you on Tuesday.    Continue to eat a healthy diet.  Be careful with portion sizes.  Includes lots of fresh fruits, vegetables, whole grains, lean proteins.  See info below.    Keep hydrated.  Be sure to drink at least 8-10, 8 oz, glasses of water every day.    Stay active.  Try to do some sort of physical activity every day.  Nothing outrageous, just try walking for 10-15 minutes each day.

## 2025-02-22 DIAGNOSIS — Z00.00 ENCOUNTER FOR MEDICARE ANNUAL WELLNESS EXAM: ICD-10-CM

## 2025-07-16 DIAGNOSIS — E89.0 POSTABLATIVE HYPOTHYROIDISM: ICD-10-CM

## 2025-07-16 RX ORDER — LEVOTHYROXINE SODIUM 75 UG/1
TABLET ORAL
Qty: 90 TABLET | Refills: 3 | Status: SHIPPED | OUTPATIENT
Start: 2025-07-16

## 2025-07-16 NOTE — TELEPHONE ENCOUNTER
Care Due:                  Date            Visit Type   Department     Provider  --------------------------------------------------------------------------------                                EP -                              PRIMARY      [facility] PRIMARY  Last Visit: 02-      CARE ([facility])   JUANA Medina                              EP -                              PRIMARY      [facility] PRIMARY  Next Visit: 08-      [facility] ([facility])   JUANA Medina                                                            Last  Test          Frequency    Reason                     Performed    Due Date  --------------------------------------------------------------------------------    CBC.........  12 months..  meloxicam................  09- 04-    CMP.........  12 months..  alendronate, meloxicam,    04- 04-                             triamterene-hydrochloroth                             iazide...................    HBA1C.......  6 months...  metFORMIN................  04-   10-    Mg Level....  12 months..  alendronate..............  Not Found    Overdue    Phosphate...  12 months..  alendronate..............  Not Found    Overdue    [employer] Embedded Care Due Messages. Reference number: [id number].   7/16/2025 12:01:47 PM CDT

## 2025-07-16 NOTE — TELEPHONE ENCOUNTER
Refill Routing Note   Medication(s) are not appropriate for processing by [facility] for the following reason(s):        Required labs outdated    [facility] action(s):  Defer   Requires labs : Yes             Appointments  past 12m or future 3m with PCP    Date Provider   Last Visit   2/1/2025 Mauro Medina MD   Next Visit   8/5/2025 Mauro Medina MD   ED visits in past 90 days: 0        Note composed:2:39 PM 07/16/2025

## 2025-07-25 ENCOUNTER — TELEPHONE (OUTPATIENT)
Dept: PRIMARY CARE CLINIC | Facility: CLINIC | Age: 70
End: 2025-07-25
Payer: COMMERCIAL

## 2025-07-25 NOTE — TELEPHONE ENCOUNTER
Pt rescheduled to 08/07/25    Copied from CRM #[id number]. Topic: Appointments - Appointment Rescheduling  >> Jul 25, 2025  9:22 AM Annika wrote:                                                Reschedule Appointment     Patient Name:MICHELLE SMITH [[id number]]        Original Date Of Appt:08/05/25         Preferred Date:08/07/25 -08/14/25 (evenings) after 12:00         Contact Info:[phone]    Additional information; patient had to reschedule her surgery and needs schedule change due to this.

## 2025-08-07 ENCOUNTER — OFFICE VISIT (OUTPATIENT)
Dept: PRIMARY CARE CLINIC | Facility: CLINIC | Age: 70
End: 2025-08-07
Payer: MEDICARE

## 2025-08-07 VITALS
HEART RATE: 93 BPM | WEIGHT: 115.31 LBS | OXYGEN SATURATION: 95 % | TEMPERATURE: 98 F | SYSTOLIC BLOOD PRESSURE: 106 MMHG | HEIGHT: 59 IN | BODY MASS INDEX: 23.24 KG/M2 | DIASTOLIC BLOOD PRESSURE: 60 MMHG

## 2025-08-07 DIAGNOSIS — E78.2 MIXED HYPERLIPIDEMIA: ICD-10-CM

## 2025-08-07 DIAGNOSIS — R91.1 PULMONARY NODULE: ICD-10-CM

## 2025-08-07 DIAGNOSIS — F17.210 CIGARETTE NICOTINE DEPENDENCE WITHOUT COMPLICATION: ICD-10-CM

## 2025-08-07 DIAGNOSIS — R73.03 PREDIABETES: ICD-10-CM

## 2025-08-07 DIAGNOSIS — J44.9 CHRONIC OBSTRUCTIVE PULMONARY DISEASE, UNSPECIFIED COPD TYPE: ICD-10-CM

## 2025-08-07 DIAGNOSIS — E89.0 POSTABLATIVE HYPOTHYROIDISM: ICD-10-CM

## 2025-08-07 DIAGNOSIS — F41.9 ANXIETY: Primary | ICD-10-CM

## 2025-08-07 PROCEDURE — G2211 COMPLEX E/M VISIT ADD ON: HCPCS | Mod: ,,, | Performed by: PHYSICIAN ASSISTANT

## 2025-08-07 PROCEDURE — 99999 PR PBB SHADOW E&M-EST. PATIENT-LVL III: CPT | Mod: PBBFAC,,, | Performed by: PHYSICIAN ASSISTANT

## 2025-08-07 PROCEDURE — 99213 OFFICE O/P EST LOW 20 MIN: CPT | Mod: PBBFAC,PN | Performed by: PHYSICIAN ASSISTANT

## 2025-08-07 PROCEDURE — 99215 OFFICE O/P EST HI 40 MIN: CPT | Mod: S$PBB,,, | Performed by: PHYSICIAN ASSISTANT

## 2025-08-07 RX ORDER — OXYCODONE AND ACETAMINOPHEN 10; 325 MG/1; MG/1
.5-1 TABLET ORAL 3 TIMES DAILY
COMMUNITY
Start: 2025-07-15

## 2025-08-07 RX ORDER — PHENTERMINE HYDROCHLORIDE 37.5 MG/1
37.5 TABLET ORAL
Qty: 30 TABLET | Refills: 5 | Status: CANCELLED | OUTPATIENT
Start: 2025-08-07

## 2025-08-07 RX ORDER — ALPRAZOLAM 0.5 MG/1
1 TABLET, EXTENDED RELEASE ORAL DAILY PRN
Qty: 45 TABLET | Refills: 5 | Status: CANCELLED | OUTPATIENT
Start: 2025-08-07

## 2025-08-19 DIAGNOSIS — R53.83 FATIGUE, UNSPECIFIED TYPE: ICD-10-CM

## 2025-08-19 DIAGNOSIS — F41.9 ANXIETY: ICD-10-CM

## 2025-08-19 DIAGNOSIS — R73.03 PREDIABETES: ICD-10-CM

## 2025-08-19 DIAGNOSIS — M85.80 OSTEOPENIA, UNSPECIFIED LOCATION: ICD-10-CM

## 2025-08-19 RX ORDER — ALENDRONATE SODIUM 70 MG/1
TABLET ORAL
Qty: 4 TABLET | Refills: 11 | Status: SHIPPED | OUTPATIENT
Start: 2025-08-19

## 2025-08-20 RX ORDER — CYANOCOBALAMIN 1000 UG/ML
INJECTION, SOLUTION INTRAMUSCULAR; SUBCUTANEOUS
Qty: 10 ML | Refills: 5 | Status: SHIPPED | OUTPATIENT
Start: 2025-08-20

## 2025-08-20 RX ORDER — PHENTERMINE HYDROCHLORIDE 37.5 MG/1
TABLET ORAL
Qty: 30 TABLET | Refills: 5 | Status: SHIPPED | OUTPATIENT
Start: 2025-08-20

## 2025-08-20 RX ORDER — ALPRAZOLAM 0.5 MG/1
1 TABLET, EXTENDED RELEASE ORAL DAILY PRN
Qty: 60 TABLET | Refills: 5 | Status: SHIPPED | OUTPATIENT
Start: 2025-08-20